# Patient Record
Sex: MALE | Race: BLACK OR AFRICAN AMERICAN | NOT HISPANIC OR LATINO
[De-identification: names, ages, dates, MRNs, and addresses within clinical notes are randomized per-mention and may not be internally consistent; named-entity substitution may affect disease eponyms.]

---

## 2017-03-01 ENCOUNTER — APPOINTMENT (OUTPATIENT)
Dept: UROLOGY | Facility: CLINIC | Age: 72
End: 2017-03-01

## 2017-03-01 VITALS — SYSTOLIC BLOOD PRESSURE: 150 MMHG | HEART RATE: 64 BPM | TEMPERATURE: 97.6 F | DIASTOLIC BLOOD PRESSURE: 79 MMHG

## 2017-03-02 LAB
APPEARANCE: CLEAR
BACTERIA: NEGATIVE
BILIRUBIN URINE: NEGATIVE
BLOOD URINE: ABNORMAL
COLOR: YELLOW
CORE LAB FLUID CYTOLOGY: NORMAL
GLUCOSE QUALITATIVE U: NORMAL MG/DL
KETONES URINE: NEGATIVE
LEUKOCYTE ESTERASE URINE: NEGATIVE
MICROSCOPIC-UA: NORMAL
NITRITE URINE: NEGATIVE
PH URINE: 6
PROTEIN URINE: NEGATIVE MG/DL
RED BLOOD CELLS URINE: 0 /HPF
SPECIFIC GRAVITY URINE: 1.01
SQUAMOUS EPITHELIAL CELLS: 0 /HPF
UROBILINOGEN URINE: NORMAL MG/DL
WHITE BLOOD CELLS URINE: 0 /HPF

## 2017-03-03 LAB — BACTERIA UR CULT: NORMAL

## 2017-05-31 ENCOUNTER — OUTPATIENT (OUTPATIENT)
Dept: OUTPATIENT SERVICES | Facility: HOSPITAL | Age: 72
LOS: 1 days | End: 2017-05-31
Payer: MEDICARE

## 2017-05-31 ENCOUNTER — APPOINTMENT (OUTPATIENT)
Dept: UROLOGY | Facility: CLINIC | Age: 72
End: 2017-05-31

## 2017-05-31 VITALS — HEART RATE: 54 BPM | DIASTOLIC BLOOD PRESSURE: 69 MMHG | SYSTOLIC BLOOD PRESSURE: 121 MMHG | TEMPERATURE: 98.5 F

## 2017-05-31 DIAGNOSIS — Z00.00 ENCOUNTER FOR GENERAL ADULT MEDICAL EXAMINATION W/OUT ABNORMAL FINDINGS: ICD-10-CM

## 2017-05-31 DIAGNOSIS — R35.0 FREQUENCY OF MICTURITION: ICD-10-CM

## 2017-05-31 PROCEDURE — 52000 CYSTOURETHROSCOPY: CPT

## 2017-06-01 DIAGNOSIS — C61 MALIGNANT NEOPLASM OF PROSTATE: ICD-10-CM

## 2017-06-01 DIAGNOSIS — N35.9 URETHRAL STRICTURE, UNSPECIFIED: ICD-10-CM

## 2017-06-01 LAB
APPEARANCE: CLEAR
BACTERIA: NEGATIVE
BILIRUBIN URINE: NEGATIVE
BLOOD URINE: NEGATIVE
COLOR: YELLOW
CORE LAB FLUID CYTOLOGY: NORMAL
GLUCOSE QUALITATIVE U: NORMAL MG/DL
KETONES URINE: ABNORMAL
LEUKOCYTE ESTERASE URINE: NEGATIVE
MICROSCOPIC-UA: NORMAL
NITRITE URINE: NEGATIVE
PH URINE: 6
PROTEIN URINE: NEGATIVE MG/DL
RED BLOOD CELLS URINE: 4 /HPF
SPECIFIC GRAVITY URINE: 1.02
SQUAMOUS EPITHELIAL CELLS: 0 /HPF
UROBILINOGEN URINE: NORMAL MG/DL
WHITE BLOOD CELLS URINE: 1 /HPF

## 2017-06-02 LAB — BACTERIA UR CULT: NORMAL

## 2017-06-13 ENCOUNTER — APPOINTMENT (OUTPATIENT)
Dept: UROLOGY | Facility: CLINIC | Age: 72
End: 2017-06-13

## 2017-06-13 VITALS — DIASTOLIC BLOOD PRESSURE: 89 MMHG | HEART RATE: 65 BPM | SYSTOLIC BLOOD PRESSURE: 148 MMHG | OXYGEN SATURATION: 99 %

## 2017-06-13 DIAGNOSIS — R39.12 POOR URINARY STREAM: ICD-10-CM

## 2017-06-14 ENCOUNTER — OTHER (OUTPATIENT)
Age: 72
End: 2017-06-14

## 2017-06-23 ENCOUNTER — RESULT REVIEW (OUTPATIENT)
Age: 72
End: 2017-06-23

## 2017-06-23 ENCOUNTER — OUTPATIENT (OUTPATIENT)
Dept: OUTPATIENT SERVICES | Facility: HOSPITAL | Age: 72
LOS: 1 days | Discharge: HOME | End: 2017-06-23
Payer: MEDICARE

## 2017-06-23 ENCOUNTER — TRANSCRIPTION ENCOUNTER (OUTPATIENT)
Age: 72
End: 2017-06-23

## 2017-06-23 DIAGNOSIS — K92.1 MELENA: ICD-10-CM

## 2017-06-23 PROCEDURE — 88313 SPECIAL STAINS GROUP 2: CPT

## 2017-06-23 PROCEDURE — 88305 TISSUE EXAM BY PATHOLOGIST: CPT | Mod: 26

## 2017-06-23 PROCEDURE — 88313 SPECIAL STAINS GROUP 2: CPT | Mod: 26

## 2017-06-23 PROCEDURE — 43239 EGD BIOPSY SINGLE/MULTIPLE: CPT

## 2017-06-23 PROCEDURE — 88312 SPECIAL STAINS GROUP 1: CPT

## 2017-06-23 PROCEDURE — 88312 SPECIAL STAINS GROUP 1: CPT | Mod: 26

## 2017-06-23 PROCEDURE — 88305 TISSUE EXAM BY PATHOLOGIST: CPT

## 2017-06-26 LAB — SURGICAL PATHOLOGY FINAL REPORT - CH: SIGNIFICANT CHANGE UP

## 2017-07-02 DIAGNOSIS — K29.50 UNSPECIFIED CHRONIC GASTRITIS WITHOUT BLEEDING: ICD-10-CM

## 2017-07-02 DIAGNOSIS — B37.81 CANDIDAL ESOPHAGITIS: ICD-10-CM

## 2017-07-02 DIAGNOSIS — I48.91 UNSPECIFIED ATRIAL FIBRILLATION: ICD-10-CM

## 2017-07-02 DIAGNOSIS — G47.33 OBSTRUCTIVE SLEEP APNEA (ADULT) (PEDIATRIC): ICD-10-CM

## 2017-07-02 DIAGNOSIS — K44.9 DIAPHRAGMATIC HERNIA WITHOUT OBSTRUCTION OR GANGRENE: ICD-10-CM

## 2017-07-07 ENCOUNTER — OUTPATIENT (OUTPATIENT)
Dept: OUTPATIENT SERVICES | Facility: HOSPITAL | Age: 72
LOS: 1 days | End: 2017-07-07
Payer: MEDICARE

## 2017-07-07 ENCOUNTER — APPOINTMENT (OUTPATIENT)
Dept: UROLOGY | Facility: CLINIC | Age: 72
End: 2017-07-07

## 2017-07-07 VITALS
DIASTOLIC BLOOD PRESSURE: 91 MMHG | SYSTOLIC BLOOD PRESSURE: 162 MMHG | TEMPERATURE: 97.9 F | HEART RATE: 54 BPM | RESPIRATION RATE: 18 BRPM

## 2017-07-07 DIAGNOSIS — R35.0 FREQUENCY OF MICTURITION: ICD-10-CM

## 2017-07-07 PROCEDURE — 52000 CYSTOURETHROSCOPY: CPT

## 2017-07-07 PROCEDURE — 74450 X-RAY URETHRA/BLADDER: CPT

## 2017-07-07 PROCEDURE — 51610 INJECTION FOR BLADDER X-RAY: CPT

## 2017-07-11 DIAGNOSIS — N42.89 OTHER SPECIFIED DISORDERS OF PROSTATE: ICD-10-CM

## 2017-07-24 ENCOUNTER — OUTPATIENT (OUTPATIENT)
Dept: OUTPATIENT SERVICES | Facility: HOSPITAL | Age: 72
LOS: 1 days | End: 2017-07-24
Payer: MEDICARE

## 2017-07-24 VITALS
OXYGEN SATURATION: 97 % | TEMPERATURE: 99 F | HEART RATE: 59 BPM | SYSTOLIC BLOOD PRESSURE: 125 MMHG | DIASTOLIC BLOOD PRESSURE: 80 MMHG | HEIGHT: 72 IN | RESPIRATION RATE: 20 BRPM | WEIGHT: 306.88 LBS

## 2017-07-24 DIAGNOSIS — N42.89 OTHER SPECIFIED DISORDERS OF PROSTATE: ICD-10-CM

## 2017-07-24 DIAGNOSIS — G47.30 SLEEP APNEA, UNSPECIFIED: ICD-10-CM

## 2017-07-24 DIAGNOSIS — N35.9 URETHRAL STRICTURE, UNSPECIFIED: ICD-10-CM

## 2017-07-24 DIAGNOSIS — I10 ESSENTIAL (PRIMARY) HYPERTENSION: ICD-10-CM

## 2017-07-24 DIAGNOSIS — Z90.79 ACQUIRED ABSENCE OF OTHER GENITAL ORGAN(S): Chronic | ICD-10-CM

## 2017-07-24 DIAGNOSIS — Z98.890 OTHER SPECIFIED POSTPROCEDURAL STATES: Chronic | ICD-10-CM

## 2017-07-24 DIAGNOSIS — K21.9 GASTRO-ESOPHAGEAL REFLUX DISEASE WITHOUT ESOPHAGITIS: ICD-10-CM

## 2017-07-24 DIAGNOSIS — Z01.818 ENCOUNTER FOR OTHER PREPROCEDURAL EXAMINATION: ICD-10-CM

## 2017-07-24 DIAGNOSIS — N42.9 DISORDER OF PROSTATE, UNSPECIFIED: ICD-10-CM

## 2017-07-24 LAB
ANION GAP SERPL CALC-SCNC: 14 MMOL/L — SIGNIFICANT CHANGE UP (ref 5–17)
BLD GP AB SCN SERPL QL: NEGATIVE — SIGNIFICANT CHANGE UP
BUN SERPL-MCNC: 21 MG/DL — SIGNIFICANT CHANGE UP (ref 7–23)
CALCIUM SERPL-MCNC: 9.1 MG/DL — SIGNIFICANT CHANGE UP (ref 8.4–10.5)
CHLORIDE SERPL-SCNC: 104 MMOL/L — SIGNIFICANT CHANGE UP (ref 96–108)
CO2 SERPL-SCNC: 20 MMOL/L — LOW (ref 22–31)
CREAT SERPL-MCNC: 1.46 MG/DL — HIGH (ref 0.5–1.3)
GLUCOSE SERPL-MCNC: 92 MG/DL — SIGNIFICANT CHANGE UP (ref 70–99)
HCT VFR BLD CALC: 38.6 % — LOW (ref 39–50)
HGB BLD-MCNC: 12.6 G/DL — LOW (ref 13–17)
MCHC RBC-ENTMCNC: 28.9 PG — SIGNIFICANT CHANGE UP (ref 27–34)
MCHC RBC-ENTMCNC: 32.6 GM/DL — SIGNIFICANT CHANGE UP (ref 32–36)
MCV RBC AUTO: 88.5 FL — SIGNIFICANT CHANGE UP (ref 80–100)
PLATELET # BLD AUTO: 212 K/UL — SIGNIFICANT CHANGE UP (ref 150–400)
POTASSIUM SERPL-MCNC: 4.8 MMOL/L — SIGNIFICANT CHANGE UP (ref 3.5–5.3)
POTASSIUM SERPL-SCNC: 4.8 MMOL/L — SIGNIFICANT CHANGE UP (ref 3.5–5.3)
RBC # BLD: 4.36 M/UL — SIGNIFICANT CHANGE UP (ref 4.2–5.8)
RBC # FLD: 15.1 % — HIGH (ref 10.3–14.5)
RH IG SCN BLD-IMP: POSITIVE — SIGNIFICANT CHANGE UP
SODIUM SERPL-SCNC: 138 MMOL/L — SIGNIFICANT CHANGE UP (ref 135–145)
WBC # BLD: 7.57 K/UL — SIGNIFICANT CHANGE UP (ref 3.8–10.5)
WBC # FLD AUTO: 7.57 K/UL — SIGNIFICANT CHANGE UP (ref 3.8–10.5)

## 2017-07-24 PROCEDURE — G0463: CPT

## 2017-07-24 PROCEDURE — 85027 COMPLETE CBC AUTOMATED: CPT

## 2017-07-24 PROCEDURE — 87086 URINE CULTURE/COLONY COUNT: CPT

## 2017-07-24 PROCEDURE — 86901 BLOOD TYPING SEROLOGIC RH(D): CPT

## 2017-07-24 PROCEDURE — 86900 BLOOD TYPING SEROLOGIC ABO: CPT

## 2017-07-24 PROCEDURE — 80048 BASIC METABOLIC PNL TOTAL CA: CPT

## 2017-07-24 PROCEDURE — 86850 RBC ANTIBODY SCREEN: CPT

## 2017-07-24 RX ORDER — CEFAZOLIN SODIUM 1 G
3000 VIAL (EA) INJECTION ONCE
Qty: 0 | Refills: 0 | Status: DISCONTINUED | OUTPATIENT
Start: 2017-08-02 | End: 2017-08-17

## 2017-07-24 NOTE — H&P PST ADULT - PMH
Afib  controlled with  flecainide  BPH (benign prostatic hyperplasia)    Cervical disc disease  F/u with neurology - currently going to  Physical therapy  GERD (gastroesophageal reflux disease)    H/O prostate cancer  s/p brachy therapy ( 2007 )  HLD (hyperlipidemia)    HTN (hypertension)    Prostate disease  uretheral stricture  Sleep apnea  ON CPAP

## 2017-07-24 NOTE — H&P PST ADULT - PROBLEM SELECTOR PLAN 1
Transurethral Bipolar Resection/vaporization of Prostate/ Stricture, Urethral dilation ,Injection of Triamcinolone 8/2/2017.  Pre- Op instructions discussed  CNC,BMP urine Culture Type and screen sent

## 2017-07-24 NOTE — H&P PST ADULT - PSH
History of hyperbaric oxygen therapy  2014  S/P arthroscopy of shoulder  2008  S/P hernia surgery  left Inguinal ( 2015 )  S/P TURP  2007, 2014 and 2015

## 2017-07-24 NOTE — H&P PST ADULT - ACTIVITY
pt is very active ,able to participate all exercise like walk,1-2 flight of stairs without any distress

## 2017-07-24 NOTE — H&P PST ADULT - HISTORY OF PRESENT ILLNESS
73 y/o male with PMH of ,Afib ,BPH, Obesity, MANE ( on CPAP ) ,GERD, HTN ,HLD ,h/o prostate cancer s/p Brachy (2007),multiple TURP in the past .Pt has been experiencing weak stream, urinary frequency f/u with urology s/p cystoscopy showed proximal stricture .Now coming in PST for scheduled  Transurethral Bipolar Resection/vaporization of Prostate/ Stricture, Urethral dilation ,Injection of Triamcinolone 8/2/2017.

## 2017-07-24 NOTE — H&P PST ADULT - NEGATIVE CARDIOVASCULAR SYMPTOMS
no paroxysmal nocturnal dyspnea/no claudication/no chest pain/no orthopnea/no peripheral edema/no dyspnea on exertion/no palpitations

## 2017-07-24 NOTE — H&P PST ADULT - MUSCULOSKELETAL
details… detailed exam no joint warmth/ROM intact/normal/no calf tenderness/no joint erythema/no joint swelling

## 2017-07-25 LAB
CULTURE RESULTS: NO GROWTH — SIGNIFICANT CHANGE UP
SPECIMEN SOURCE: SIGNIFICANT CHANGE UP

## 2017-08-02 ENCOUNTER — APPOINTMENT (OUTPATIENT)
Dept: UROLOGY | Facility: HOSPITAL | Age: 72
End: 2017-08-02

## 2017-08-02 ENCOUNTER — OUTPATIENT (OUTPATIENT)
Dept: OUTPATIENT SERVICES | Facility: HOSPITAL | Age: 72
LOS: 1 days | End: 2017-08-02
Payer: MEDICARE

## 2017-08-02 ENCOUNTER — TRANSCRIPTION ENCOUNTER (OUTPATIENT)
Age: 72
End: 2017-08-02

## 2017-08-02 ENCOUNTER — RESULT REVIEW (OUTPATIENT)
Age: 72
End: 2017-08-02

## 2017-08-02 VITALS
DIASTOLIC BLOOD PRESSURE: 80 MMHG | RESPIRATION RATE: 17 BRPM | OXYGEN SATURATION: 100 % | TEMPERATURE: 97 F | SYSTOLIC BLOOD PRESSURE: 139 MMHG | HEART RATE: 52 BPM

## 2017-08-02 VITALS
WEIGHT: 300.05 LBS | HEART RATE: 55 BPM | SYSTOLIC BLOOD PRESSURE: 146 MMHG | OXYGEN SATURATION: 98 % | DIASTOLIC BLOOD PRESSURE: 87 MMHG | HEIGHT: 72 IN | RESPIRATION RATE: 18 BRPM | TEMPERATURE: 99 F

## 2017-08-02 DIAGNOSIS — N35.9 URETHRAL STRICTURE, UNSPECIFIED: ICD-10-CM

## 2017-08-02 DIAGNOSIS — N42.89 OTHER SPECIFIED DISORDERS OF PROSTATE: ICD-10-CM

## 2017-08-02 DIAGNOSIS — Z98.890 OTHER SPECIFIED POSTPROCEDURAL STATES: Chronic | ICD-10-CM

## 2017-08-02 DIAGNOSIS — Z90.79 ACQUIRED ABSENCE OF OTHER GENITAL ORGAN(S): Chronic | ICD-10-CM

## 2017-08-02 LAB — RH IG SCN BLD-IMP: POSITIVE — SIGNIFICANT CHANGE UP

## 2017-08-02 PROCEDURE — 88305 TISSUE EXAM BY PATHOLOGIST: CPT

## 2017-08-02 PROCEDURE — C1769: CPT

## 2017-08-02 PROCEDURE — C1726: CPT

## 2017-08-02 PROCEDURE — 88305 TISSUE EXAM BY PATHOLOGIST: CPT | Mod: 26

## 2017-08-02 PROCEDURE — 52601 PROSTATECTOMY (TURP): CPT

## 2017-08-02 PROCEDURE — 88300 SURGICAL PATH GROSS: CPT | Mod: 26

## 2017-08-02 PROCEDURE — 86901 BLOOD TYPING SEROLOGIC RH(D): CPT

## 2017-08-02 PROCEDURE — 86900 BLOOD TYPING SEROLOGIC ABO: CPT

## 2017-08-02 PROCEDURE — 88300 SURGICAL PATH GROSS: CPT

## 2017-08-02 RX ORDER — SODIUM CHLORIDE 9 MG/ML
3 INJECTION INTRAMUSCULAR; INTRAVENOUS; SUBCUTANEOUS EVERY 8 HOURS
Qty: 0 | Refills: 0 | Status: DISCONTINUED | OUTPATIENT
Start: 2017-08-02 | End: 2017-08-02

## 2017-08-02 RX ORDER — ONDANSETRON 8 MG/1
4 TABLET, FILM COATED ORAL ONCE
Qty: 0 | Refills: 0 | Status: DISCONTINUED | OUTPATIENT
Start: 2017-08-02 | End: 2017-08-02

## 2017-08-02 RX ORDER — LIDOCAINE HCL 20 MG/ML
0.2 VIAL (ML) INJECTION ONCE
Qty: 0 | Refills: 0 | Status: DISCONTINUED | OUTPATIENT
Start: 2017-08-02 | End: 2017-08-02

## 2017-08-02 RX ORDER — SODIUM CHLORIDE 9 MG/ML
1000 INJECTION, SOLUTION INTRAVENOUS
Qty: 0 | Refills: 0 | Status: DISCONTINUED | OUTPATIENT
Start: 2017-08-02 | End: 2017-08-17

## 2017-08-02 RX ORDER — CEPHALEXIN 500 MG
1 CAPSULE ORAL
Qty: 6 | Refills: 0 | OUTPATIENT
Start: 2017-08-02 | End: 2017-08-05

## 2017-08-02 RX ORDER — OXYCODONE HYDROCHLORIDE 5 MG/1
1 TABLET ORAL
Qty: 8 | Refills: 0 | OUTPATIENT
Start: 2017-08-02

## 2017-08-02 RX ORDER — ACETAMINOPHEN 500 MG
1000 TABLET ORAL ONCE
Qty: 0 | Refills: 0 | Status: COMPLETED | OUTPATIENT
Start: 2017-08-02 | End: 2017-08-02

## 2017-08-02 RX ADMIN — Medication 400 MILLIGRAM(S): at 10:59

## 2017-08-02 NOTE — BRIEF OPERATIVE NOTE - PROCEDURE
Urethral stricture dilatation  08/02/2017    Active  URR  TURP  08/02/2017    Active  [facility]

## 2017-08-02 NOTE — ASU DISCHARGE PLAN (ADULT/PEDIATRIC). - MEDICATION SUMMARY - MEDICATIONS TO TAKE
I will START or STAY ON the medications listed below when I get home from the hospital:    eplerenone 50 mg oral tablet  -- 1 tab(s) by mouth once a day  -- Indication: For home med    acetaminophen-oxycodone 325 mg-5 mg oral tablet  -- 1 tab(s) by mouth every 4-6 hours, As Needed for pain MDD:4  -- Caution federal law prohibits the transfer of this drug to any person other  than the person for whom it was prescribed.  May cause drowsiness.  Alcohol may intensify this effect.  Use care when operating dangerous machinery.  This prescription cannot be refilled.  This product contains acetaminophen.  Do not use  with any other product containing acetaminophen to prevent possible liver damage.  Using more of this medication than prescribed may cause serious breathing problems.    -- Indication: For Pain med    aspirin 81 mg oral tablet  -- 1 tab(s) by mouth once a day (at bedtime)  -- Indication: For home med    lisinopril 40 mg oral tablet  -- 1 tab(s) by mouth once a day  -- Indication: For home med    flecainide 150 mg oral tablet  -- 1 tab(s) by mouth every 12 hours  -- Indication: For home med    gabapentin 300 mg oral capsule  -- 1 cap(s) by mouth once a day (at bedtime)  -- Indication: For home med    simvastatin 10 mg oral tablet  -- 1 tab(s) by mouth once a day (at bedtime)  -- Indication: For home med    Bystolic 20 mg oral tablet  -- 1 tab(s) by mouth once a day  -- Indication: For home med    amLODIPine 5 mg oral tablet  -- 1 tab(s) by mouth once a day  -- Indication: For home med    Keflex 500 mg oral capsule  -- 1 cap(s) by mouth 2 times a day  -- Finish all this medication unless otherwise directed by prescriber.    -- Indication: For Prevention of infection    raNITIdine 150 mg oral tablet  -- 1 tab(s) by mouth once a day (at bedtime)  -- Indication: For home med    Colace 100 mg oral capsule  -- 2 cap(s) by mouth once a day (at bedtime)  -- Indication: For home med    Pred Forte 1% ophthalmic suspension  -- 1 drop(s) to each affected eye once a day  -- Indication: For home med    omeprazole 20 mg oral delayed release capsule  -- 1 cap(s) by mouth once a day  -- Indication: For home med

## 2017-08-02 NOTE — ASU DISCHARGE PLAN (ADULT/PEDIATRIC). - YOU WERE IN THE HOSPITAL FOR:
Transurethral Resection of Prostate, Prostatic urethral dilation, Injection of kenalog in prostatic stricture

## 2017-08-02 NOTE — ASU DISCHARGE PLAN (ADULT/PEDIATRIC). - ITEMS TO FOLLOWUP WITH YOUR PHYSICIAN'S
Please call the office once you are discharged and follow up with Dr. Art this Friday. Office # [phone]

## 2017-08-03 LAB — SURGICAL PATHOLOGY STUDY: SIGNIFICANT CHANGE UP

## 2017-08-04 ENCOUNTER — APPOINTMENT (OUTPATIENT)
Dept: UROLOGY | Facility: CLINIC | Age: 72
End: 2017-08-04
Payer: MEDICARE

## 2017-08-04 PROCEDURE — 99024 POSTOP FOLLOW-UP VISIT: CPT

## 2017-08-08 ENCOUNTER — APPOINTMENT (OUTPATIENT)
Age: 72
End: 2017-08-08

## 2017-08-18 ENCOUNTER — APPOINTMENT (OUTPATIENT)
Dept: UROLOGY | Facility: CLINIC | Age: 72
End: 2017-08-18
Payer: MEDICARE

## 2017-08-18 VITALS
RESPIRATION RATE: 18 BRPM | DIASTOLIC BLOOD PRESSURE: 85 MMHG | WEIGHT: 298 LBS | HEART RATE: 54 BPM | HEIGHT: 72 IN | TEMPERATURE: 98 F | BODY MASS INDEX: 40.36 KG/M2 | SYSTOLIC BLOOD PRESSURE: 126 MMHG

## 2017-08-18 PROCEDURE — 99024 POSTOP FOLLOW-UP VISIT: CPT

## 2017-08-21 LAB — BACTERIA UR CULT: NORMAL

## 2017-10-27 ENCOUNTER — OUTPATIENT (OUTPATIENT)
Dept: OUTPATIENT SERVICES | Facility: HOSPITAL | Age: 72
LOS: 1 days | End: 2017-10-27
Payer: MEDICARE

## 2017-10-27 ENCOUNTER — APPOINTMENT (OUTPATIENT)
Dept: UROLOGY | Facility: CLINIC | Age: 72
End: 2017-10-27
Payer: MEDICARE

## 2017-10-27 VITALS — DIASTOLIC BLOOD PRESSURE: 92 MMHG | RESPIRATION RATE: 18 BRPM | SYSTOLIC BLOOD PRESSURE: 160 MMHG | HEART RATE: 66 BPM

## 2017-10-27 DIAGNOSIS — Z98.890 OTHER SPECIFIED POSTPROCEDURAL STATES: Chronic | ICD-10-CM

## 2017-10-27 DIAGNOSIS — Z90.79 ACQUIRED ABSENCE OF OTHER GENITAL ORGAN(S): Chronic | ICD-10-CM

## 2017-10-27 DIAGNOSIS — R35.0 FREQUENCY OF MICTURITION: ICD-10-CM

## 2017-10-27 PROCEDURE — 52000 CYSTOURETHROSCOPY: CPT | Mod: 58

## 2017-10-27 PROCEDURE — 52000 CYSTOURETHROSCOPY: CPT

## 2017-11-03 DIAGNOSIS — N42.89 OTHER SPECIFIED DISORDERS OF PROSTATE: ICD-10-CM

## 2018-01-12 ENCOUNTER — APPOINTMENT (OUTPATIENT)
Dept: UROLOGY | Facility: CLINIC | Age: 73
End: 2018-01-12
Payer: MEDICARE

## 2018-01-12 VITALS
RESPIRATION RATE: 18 BRPM | SYSTOLIC BLOOD PRESSURE: 159 MMHG | TEMPERATURE: 98.4 F | HEART RATE: 60 BPM | DIASTOLIC BLOOD PRESSURE: 88 MMHG

## 2018-01-12 PROCEDURE — 99214 OFFICE O/P EST MOD 30 MIN: CPT

## 2018-01-22 ENCOUNTER — APPOINTMENT (OUTPATIENT)
Age: 73
End: 2018-01-22

## 2018-01-22 LAB — PSA SERPL-MCNC: 0.01 NG/ML

## 2018-02-23 ENCOUNTER — APPOINTMENT (OUTPATIENT)
Dept: UROLOGY | Facility: CLINIC | Age: 73
End: 2018-02-23
Payer: MEDICARE

## 2018-02-23 VITALS — DIASTOLIC BLOOD PRESSURE: 80 MMHG | SYSTOLIC BLOOD PRESSURE: 130 MMHG | RESPIRATION RATE: 18 BRPM | HEART RATE: 53 BPM

## 2018-02-23 DIAGNOSIS — R35.0 FREQUENCY OF MICTURITION: ICD-10-CM

## 2018-02-23 DIAGNOSIS — N35.9 URETHRAL STRICTURE, UNSPECIFIED: ICD-10-CM

## 2018-02-23 PROCEDURE — 99214 OFFICE O/P EST MOD 30 MIN: CPT

## 2018-02-23 PROCEDURE — 51798 US URINE CAPACITY MEASURE: CPT

## 2018-02-27 ENCOUNTER — RX RENEWAL (OUTPATIENT)
Age: 73
End: 2018-02-27

## 2018-03-26 ENCOUNTER — RX RENEWAL (OUTPATIENT)
Age: 73
End: 2018-03-26

## 2018-06-02 ENCOUNTER — INPATIENT (INPATIENT)
Facility: HOSPITAL | Age: 73
LOS: 4 days | Discharge: HOME | End: 2018-06-07
Attending: HOSPITALIST | Admitting: HOSPITALIST
Payer: MEDICARE

## 2018-06-02 VITALS
RESPIRATION RATE: 18 BRPM | HEART RATE: 68 BPM | SYSTOLIC BLOOD PRESSURE: 147 MMHG | DIASTOLIC BLOOD PRESSURE: 82 MMHG | TEMPERATURE: 101 F | OXYGEN SATURATION: 97 %

## 2018-06-02 DIAGNOSIS — Z98.890 OTHER SPECIFIED POSTPROCEDURAL STATES: Chronic | ICD-10-CM

## 2018-06-02 DIAGNOSIS — Z90.79 ACQUIRED ABSENCE OF OTHER GENITAL ORGAN(S): Chronic | ICD-10-CM

## 2018-06-02 DIAGNOSIS — I48.91 UNSPECIFIED ATRIAL FIBRILLATION: ICD-10-CM

## 2018-06-02 DIAGNOSIS — N40.0 BENIGN PROSTATIC HYPERPLASIA WITHOUT LOWER URINARY TRACT SYMPTOMS: ICD-10-CM

## 2018-06-02 DIAGNOSIS — I10 ESSENTIAL (PRIMARY) HYPERTENSION: ICD-10-CM

## 2018-06-02 DIAGNOSIS — Z29.9 ENCOUNTER FOR PROPHYLACTIC MEASURES, UNSPECIFIED: ICD-10-CM

## 2018-06-02 DIAGNOSIS — N17.9 ACUTE KIDNEY FAILURE, UNSPECIFIED: ICD-10-CM

## 2018-06-02 DIAGNOSIS — N12 TUBULO-INTERSTITIAL NEPHRITIS, NOT SPECIFIED AS ACUTE OR CHRONIC: ICD-10-CM

## 2018-06-02 LAB
ALBUMIN SERPL ELPH-MCNC: 3.7 G/DL — SIGNIFICANT CHANGE UP (ref 3.3–5)
ALBUMIN SERPL ELPH-MCNC: 4.3 G/DL — SIGNIFICANT CHANGE UP (ref 3.3–5)
ALP SERPL-CCNC: 50 U/L — SIGNIFICANT CHANGE UP (ref 40–120)
ALP SERPL-CCNC: 55 U/L — SIGNIFICANT CHANGE UP (ref 40–120)
ALT FLD-CCNC: 12 U/L — SIGNIFICANT CHANGE UP (ref 4–41)
ALT FLD-CCNC: 12 U/L — SIGNIFICANT CHANGE UP (ref 4–41)
APPEARANCE UR: SIGNIFICANT CHANGE UP
AST SERPL-CCNC: 15 U/L — SIGNIFICANT CHANGE UP (ref 4–40)
AST SERPL-CCNC: 16 U/L — SIGNIFICANT CHANGE UP (ref 4–40)
B PERT DNA SPEC QL NAA+PROBE: SIGNIFICANT CHANGE UP
BASOPHILS # BLD AUTO: 0.02 K/UL — SIGNIFICANT CHANGE UP (ref 0–0.2)
BASOPHILS NFR BLD AUTO: 0.2 % — SIGNIFICANT CHANGE UP (ref 0–2)
BILIRUB SERPL-MCNC: 0.8 MG/DL — SIGNIFICANT CHANGE UP (ref 0.2–1.2)
BILIRUB SERPL-MCNC: 0.9 MG/DL — SIGNIFICANT CHANGE UP (ref 0.2–1.2)
BILIRUB UR-MCNC: NEGATIVE — SIGNIFICANT CHANGE UP
BLOOD UR QL VISUAL: HIGH
BUN SERPL-MCNC: 19 MG/DL — SIGNIFICANT CHANGE UP (ref 7–23)
BUN SERPL-MCNC: 20 MG/DL — SIGNIFICANT CHANGE UP (ref 7–23)
C PNEUM DNA SPEC QL NAA+PROBE: NOT DETECTED — SIGNIFICANT CHANGE UP
CALCIUM SERPL-MCNC: 8.9 MG/DL — SIGNIFICANT CHANGE UP (ref 8.4–10.5)
CALCIUM SERPL-MCNC: 9.3 MG/DL — SIGNIFICANT CHANGE UP (ref 8.4–10.5)
CHLORIDE SERPL-SCNC: 101 MMOL/L — SIGNIFICANT CHANGE UP (ref 98–107)
CHLORIDE SERPL-SCNC: 98 MMOL/L — SIGNIFICANT CHANGE UP (ref 98–107)
CO2 SERPL-SCNC: 22 MMOL/L — SIGNIFICANT CHANGE UP (ref 22–31)
CO2 SERPL-SCNC: 24 MMOL/L — SIGNIFICANT CHANGE UP (ref 22–31)
COLOR SPEC: YELLOW — SIGNIFICANT CHANGE UP
CREAT SERPL-MCNC: 1.75 MG/DL — HIGH (ref 0.5–1.3)
CREAT SERPL-MCNC: 1.93 MG/DL — HIGH (ref 0.5–1.3)
EOSINOPHIL # BLD AUTO: 0.02 K/UL — SIGNIFICANT CHANGE UP (ref 0–0.5)
EOSINOPHIL NFR BLD AUTO: 0.2 % — SIGNIFICANT CHANGE UP (ref 0–6)
FLUAV H1 2009 PAND RNA SPEC QL NAA+PROBE: NOT DETECTED — SIGNIFICANT CHANGE UP
FLUAV H1 RNA SPEC QL NAA+PROBE: NOT DETECTED — SIGNIFICANT CHANGE UP
FLUAV H3 RNA SPEC QL NAA+PROBE: NOT DETECTED — SIGNIFICANT CHANGE UP
FLUAV SUBTYP SPEC NAA+PROBE: SIGNIFICANT CHANGE UP
FLUBV RNA SPEC QL NAA+PROBE: NOT DETECTED — SIGNIFICANT CHANGE UP
GLUCOSE SERPL-MCNC: 111 MG/DL — HIGH (ref 70–99)
GLUCOSE SERPL-MCNC: 124 MG/DL — HIGH (ref 70–99)
GLUCOSE UR-MCNC: NEGATIVE — SIGNIFICANT CHANGE UP
HADV DNA SPEC QL NAA+PROBE: NOT DETECTED — SIGNIFICANT CHANGE UP
HCOV 229E RNA SPEC QL NAA+PROBE: NOT DETECTED — SIGNIFICANT CHANGE UP
HCOV HKU1 RNA SPEC QL NAA+PROBE: NOT DETECTED — SIGNIFICANT CHANGE UP
HCOV NL63 RNA SPEC QL NAA+PROBE: NOT DETECTED — SIGNIFICANT CHANGE UP
HCOV OC43 RNA SPEC QL NAA+PROBE: NOT DETECTED — SIGNIFICANT CHANGE UP
HCT VFR BLD CALC: 35 % — LOW (ref 39–50)
HCT VFR BLD CALC: 37.1 % — LOW (ref 39–50)
HGB BLD-MCNC: 11.6 G/DL — LOW (ref 13–17)
HGB BLD-MCNC: 12.2 G/DL — LOW (ref 13–17)
HMPV RNA SPEC QL NAA+PROBE: NOT DETECTED — SIGNIFICANT CHANGE UP
HPIV1 RNA SPEC QL NAA+PROBE: NOT DETECTED — SIGNIFICANT CHANGE UP
HPIV2 RNA SPEC QL NAA+PROBE: NOT DETECTED — SIGNIFICANT CHANGE UP
HPIV3 RNA SPEC QL NAA+PROBE: NOT DETECTED — SIGNIFICANT CHANGE UP
HPIV4 RNA SPEC QL NAA+PROBE: NOT DETECTED — SIGNIFICANT CHANGE UP
HYALINE CASTS # UR AUTO: SIGNIFICANT CHANGE UP (ref 0–?)
IMM GRANULOCYTES # BLD AUTO: 0.05 # — SIGNIFICANT CHANGE UP
IMM GRANULOCYTES NFR BLD AUTO: 0.6 % — SIGNIFICANT CHANGE UP (ref 0–1.5)
KETONES UR-MCNC: NEGATIVE — SIGNIFICANT CHANGE UP
LEUKOCYTE ESTERASE UR-ACNC: HIGH
LYMPHOCYTES # BLD AUTO: 0.97 K/UL — LOW (ref 1–3.3)
LYMPHOCYTES # BLD AUTO: 10.9 % — LOW (ref 13–44)
M PNEUMO DNA SPEC QL NAA+PROBE: NOT DETECTED — SIGNIFICANT CHANGE UP
MAGNESIUM SERPL-MCNC: 1.9 MG/DL — SIGNIFICANT CHANGE UP (ref 1.6–2.6)
MCHC RBC-ENTMCNC: 28.8 PG — SIGNIFICANT CHANGE UP (ref 27–34)
MCHC RBC-ENTMCNC: 28.9 PG — SIGNIFICANT CHANGE UP (ref 27–34)
MCHC RBC-ENTMCNC: 32.9 % — SIGNIFICANT CHANGE UP (ref 32–36)
MCHC RBC-ENTMCNC: 33.1 % — SIGNIFICANT CHANGE UP (ref 32–36)
MCV RBC AUTO: 87.3 FL — SIGNIFICANT CHANGE UP (ref 80–100)
MCV RBC AUTO: 87.7 FL — SIGNIFICANT CHANGE UP (ref 80–100)
MONOCYTES # BLD AUTO: 0.85 K/UL — SIGNIFICANT CHANGE UP (ref 0–0.9)
MONOCYTES NFR BLD AUTO: 9.6 % — SIGNIFICANT CHANGE UP (ref 2–14)
NEUTROPHILS # BLD AUTO: 6.97 K/UL — SIGNIFICANT CHANGE UP (ref 1.8–7.4)
NEUTROPHILS NFR BLD AUTO: 78.5 % — HIGH (ref 43–77)
NITRITE UR-MCNC: POSITIVE — HIGH
NRBC # FLD: 0 — SIGNIFICANT CHANGE UP
NRBC # FLD: 0 — SIGNIFICANT CHANGE UP
PH UR: 6.5 — SIGNIFICANT CHANGE UP (ref 4.6–8)
PHOSPHATE SERPL-MCNC: 3.4 MG/DL — SIGNIFICANT CHANGE UP (ref 2.5–4.5)
PLATELET # BLD AUTO: 150 K/UL — SIGNIFICANT CHANGE UP (ref 150–400)
PLATELET # BLD AUTO: 175 K/UL — SIGNIFICANT CHANGE UP (ref 150–400)
PMV BLD: 10.3 FL — SIGNIFICANT CHANGE UP (ref 7–13)
PMV BLD: 10.7 FL — SIGNIFICANT CHANGE UP (ref 7–13)
POTASSIUM SERPL-MCNC: 4.5 MMOL/L — SIGNIFICANT CHANGE UP (ref 3.5–5.3)
POTASSIUM SERPL-MCNC: 4.5 MMOL/L — SIGNIFICANT CHANGE UP (ref 3.5–5.3)
POTASSIUM SERPL-SCNC: 4.5 MMOL/L — SIGNIFICANT CHANGE UP (ref 3.5–5.3)
POTASSIUM SERPL-SCNC: 4.5 MMOL/L — SIGNIFICANT CHANGE UP (ref 3.5–5.3)
PROT SERPL-MCNC: 6.9 G/DL — SIGNIFICANT CHANGE UP (ref 6–8.3)
PROT SERPL-MCNC: 7.8 G/DL — SIGNIFICANT CHANGE UP (ref 6–8.3)
PROT UR-MCNC: 300 MG/DL — SIGNIFICANT CHANGE UP
RBC # BLD: 4.01 M/UL — LOW (ref 4.2–5.8)
RBC # BLD: 4.23 M/UL — SIGNIFICANT CHANGE UP (ref 4.2–5.8)
RBC # FLD: 14.3 % — SIGNIFICANT CHANGE UP (ref 10.3–14.5)
RBC # FLD: 14.4 % — SIGNIFICANT CHANGE UP (ref 10.3–14.5)
RBC CASTS # UR COMP ASSIST: SIGNIFICANT CHANGE UP (ref 0–?)
RSV RNA SPEC QL NAA+PROBE: NOT DETECTED — SIGNIFICANT CHANGE UP
RV+EV RNA SPEC QL NAA+PROBE: NOT DETECTED — SIGNIFICANT CHANGE UP
SODIUM SERPL-SCNC: 136 MMOL/L — SIGNIFICANT CHANGE UP (ref 135–145)
SODIUM SERPL-SCNC: 138 MMOL/L — SIGNIFICANT CHANGE UP (ref 135–145)
SP GR SPEC: 1.02 — SIGNIFICANT CHANGE UP (ref 1–1.04)
SQUAMOUS # UR AUTO: SIGNIFICANT CHANGE UP
UROBILINOGEN FLD QL: NORMAL MG/DL — SIGNIFICANT CHANGE UP
WBC # BLD: 10.08 K/UL — SIGNIFICANT CHANGE UP (ref 3.8–10.5)
WBC # BLD: 8.88 K/UL — SIGNIFICANT CHANGE UP (ref 3.8–10.5)
WBC # FLD AUTO: 10.08 K/UL — SIGNIFICANT CHANGE UP (ref 3.8–10.5)
WBC # FLD AUTO: 8.88 K/UL — SIGNIFICANT CHANGE UP (ref 3.8–10.5)
WBC CLUMPS #/AREA URNS HPF: PRESENT — HIGH (ref 0–?)
WBC UR QL: >50 — HIGH (ref 0–?)

## 2018-06-02 PROCEDURE — 99223 1ST HOSP IP/OBS HIGH 75: CPT | Mod: GC

## 2018-06-02 PROCEDURE — 71046 X-RAY EXAM CHEST 2 VIEWS: CPT | Mod: 26

## 2018-06-02 RX ORDER — OXYBUTYNIN CHLORIDE 5 MG
10 TABLET ORAL DAILY
Qty: 0 | Refills: 0 | Status: DISCONTINUED | OUTPATIENT
Start: 2018-06-02 | End: 2018-06-02

## 2018-06-02 RX ORDER — OMEGA-3 ACID ETHYL ESTERS 1 G
2 CAPSULE ORAL
Qty: 0 | Refills: 0 | COMMUNITY

## 2018-06-02 RX ORDER — PANTOPRAZOLE SODIUM 20 MG/1
40 TABLET, DELAYED RELEASE ORAL
Qty: 0 | Refills: 0 | Status: DISCONTINUED | OUTPATIENT
Start: 2018-06-02 | End: 2018-06-05

## 2018-06-02 RX ORDER — RANITIDINE HYDROCHLORIDE 150 MG/1
1 TABLET, FILM COATED ORAL
Qty: 0 | Refills: 0 | COMMUNITY

## 2018-06-02 RX ORDER — SODIUM CHLORIDE 9 MG/ML
1000 INJECTION INTRAMUSCULAR; INTRAVENOUS; SUBCUTANEOUS
Qty: 0 | Refills: 0 | Status: DISCONTINUED | OUTPATIENT
Start: 2018-06-02 | End: 2018-06-04

## 2018-06-02 RX ORDER — CEFTRIAXONE 500 MG/1
1 INJECTION, POWDER, FOR SOLUTION INTRAMUSCULAR; INTRAVENOUS ONCE
Qty: 0 | Refills: 0 | Status: COMPLETED | OUTPATIENT
Start: 2018-06-02 | End: 2018-06-02

## 2018-06-02 RX ORDER — OXYBUTYNIN CHLORIDE 5 MG
5 TABLET ORAL
Qty: 0 | Refills: 0 | Status: DISCONTINUED | OUTPATIENT
Start: 2018-06-02 | End: 2018-06-05

## 2018-06-02 RX ORDER — SIMVASTATIN 20 MG/1
10 TABLET, FILM COATED ORAL AT BEDTIME
Qty: 0 | Refills: 0 | Status: DISCONTINUED | OUTPATIENT
Start: 2018-06-02 | End: 2018-06-05

## 2018-06-02 RX ORDER — MULTIVIT-MIN/FERROUS GLUCONATE 9 MG/15 ML
1 LIQUID (ML) ORAL
Qty: 0 | Refills: 0 | COMMUNITY

## 2018-06-02 RX ORDER — SOLIFENACIN SUCCINATE 10 MG/1
1 TABLET ORAL
Qty: 0 | Refills: 0 | COMMUNITY

## 2018-06-02 RX ORDER — FLECAINIDE ACETATE 50 MG
150 TABLET ORAL EVERY 12 HOURS
Qty: 0 | Refills: 0 | Status: DISCONTINUED | OUTPATIENT
Start: 2018-06-02 | End: 2018-06-05

## 2018-06-02 RX ORDER — PREDNISOLONE SODIUM PHOSPHATE 1 %
1 DROPS OPHTHALMIC (EYE)
Qty: 0 | Refills: 0 | COMMUNITY

## 2018-06-02 RX ORDER — ASPIRIN/CALCIUM CARB/MAGNESIUM 324 MG
81 TABLET ORAL DAILY
Qty: 0 | Refills: 0 | Status: DISCONTINUED | OUTPATIENT
Start: 2018-06-02 | End: 2018-06-05

## 2018-06-02 RX ORDER — CEFTRIAXONE 500 MG/1
1 INJECTION, POWDER, FOR SOLUTION INTRAMUSCULAR; INTRAVENOUS EVERY 24 HOURS
Qty: 0 | Refills: 0 | Status: DISCONTINUED | OUTPATIENT
Start: 2018-06-03 | End: 2018-06-04

## 2018-06-02 RX ORDER — NEBIVOLOL HYDROCHLORIDE 5 MG/1
20 TABLET ORAL DAILY
Qty: 0 | Refills: 0 | Status: DISCONTINUED | OUTPATIENT
Start: 2018-06-02 | End: 2018-06-05

## 2018-06-02 RX ORDER — ACETAMINOPHEN 500 MG
2 TABLET ORAL
Qty: 0 | Refills: 0 | COMMUNITY

## 2018-06-02 RX ORDER — GABAPENTIN 400 MG/1
300 CAPSULE ORAL AT BEDTIME
Qty: 0 | Refills: 0 | Status: DISCONTINUED | OUTPATIENT
Start: 2018-06-02 | End: 2018-06-05

## 2018-06-02 RX ORDER — FLECAINIDE ACETATE 50 MG
1 TABLET ORAL
Qty: 0 | Refills: 0 | COMMUNITY

## 2018-06-02 RX ORDER — OMEPRAZOLE 10 MG/1
1 CAPSULE, DELAYED RELEASE ORAL
Qty: 0 | Refills: 0 | COMMUNITY

## 2018-06-02 RX ORDER — HEPARIN SODIUM 5000 [USP'U]/ML
5000 INJECTION INTRAVENOUS; SUBCUTANEOUS EVERY 8 HOURS
Qty: 0 | Refills: 0 | Status: DISCONTINUED | OUTPATIENT
Start: 2018-06-02 | End: 2018-06-05

## 2018-06-02 RX ORDER — AMLODIPINE BESYLATE 2.5 MG/1
1 TABLET ORAL
Qty: 0 | Refills: 0 | COMMUNITY

## 2018-06-02 RX ORDER — DOCUSATE SODIUM 100 MG
300 CAPSULE ORAL AT BEDTIME
Qty: 0 | Refills: 0 | Status: DISCONTINUED | OUTPATIENT
Start: 2018-06-02 | End: 2018-06-05

## 2018-06-02 RX ORDER — NEBIVOLOL HYDROCHLORIDE 5 MG/1
1 TABLET ORAL
Qty: 0 | Refills: 0 | COMMUNITY

## 2018-06-02 RX ORDER — FLECAINIDE ACETATE 50 MG
150 TABLET ORAL EVERY 12 HOURS
Qty: 0 | Refills: 0 | Status: DISCONTINUED | OUTPATIENT
Start: 2018-06-02 | End: 2018-06-02

## 2018-06-02 RX ORDER — ASPIRIN/CALCIUM CARB/MAGNESIUM 324 MG
1 TABLET ORAL
Qty: 0 | Refills: 0 | COMMUNITY

## 2018-06-02 RX ORDER — SODIUM CHLORIDE 9 MG/ML
1000 INJECTION INTRAMUSCULAR; INTRAVENOUS; SUBCUTANEOUS ONCE
Qty: 0 | Refills: 0 | Status: COMPLETED | OUTPATIENT
Start: 2018-06-02 | End: 2018-06-02

## 2018-06-02 RX ORDER — AMLODIPINE BESYLATE 2.5 MG/1
10 TABLET ORAL DAILY
Qty: 0 | Refills: 0 | Status: DISCONTINUED | OUTPATIENT
Start: 2018-06-02 | End: 2018-06-05

## 2018-06-02 RX ORDER — SIMVASTATIN 20 MG/1
1 TABLET, FILM COATED ORAL
Qty: 0 | Refills: 0 | COMMUNITY

## 2018-06-02 RX ORDER — DOCUSATE SODIUM 100 MG
2 CAPSULE ORAL
Qty: 0 | Refills: 0 | COMMUNITY

## 2018-06-02 RX ORDER — GABAPENTIN 400 MG/1
1 CAPSULE ORAL
Qty: 0 | Refills: 0 | COMMUNITY

## 2018-06-02 RX ORDER — ACETAMINOPHEN 500 MG
650 TABLET ORAL ONCE
Qty: 0 | Refills: 0 | Status: COMPLETED | OUTPATIENT
Start: 2018-06-02 | End: 2018-06-02

## 2018-06-02 RX ADMIN — SODIUM CHLORIDE 1000 MILLILITER(S): 9 INJECTION INTRAMUSCULAR; INTRAVENOUS; SUBCUTANEOUS at 12:07

## 2018-06-02 RX ADMIN — Medication 5 MILLIGRAM(S): at 17:23

## 2018-06-02 RX ADMIN — Medication 150 MILLIGRAM(S): at 17:23

## 2018-06-02 RX ADMIN — SIMVASTATIN 10 MILLIGRAM(S): 20 TABLET, FILM COATED ORAL at 23:36

## 2018-06-02 RX ADMIN — HEPARIN SODIUM 5000 UNIT(S): 5000 INJECTION INTRAVENOUS; SUBCUTANEOUS at 23:36

## 2018-06-02 RX ADMIN — Medication 81 MILLIGRAM(S): at 17:23

## 2018-06-02 RX ADMIN — Medication 650 MILLIGRAM(S): at 14:22

## 2018-06-02 RX ADMIN — GABAPENTIN 300 MILLIGRAM(S): 400 CAPSULE ORAL at 23:36

## 2018-06-02 RX ADMIN — Medication 300 MILLIGRAM(S): at 23:36

## 2018-06-02 RX ADMIN — SODIUM CHLORIDE 80 MILLILITER(S): 9 INJECTION INTRAMUSCULAR; INTRAVENOUS; SUBCUTANEOUS at 17:16

## 2018-06-02 RX ADMIN — CEFTRIAXONE 100 GRAM(S): 500 INJECTION, POWDER, FOR SOLUTION INTRAMUSCULAR; INTRAVENOUS at 12:07

## 2018-06-02 NOTE — H&P ADULT - NSHPPHYSICALEXAM_GEN_ALL_CORE
GENERAL: Overweight, Comfortable, no acute distress   HEAD:  Normocephalic, atraumatic  EYES: EOMI, PERRLA  HEENT: Moist mucous membranes  NECK: Supple, No JVD  CHEST/LUNG: Clear to auscultation bilaterally, no crackles or wheezes  HEART: Regular rate and rhythm, no murmur   ABDOMEN: Soft, Nontender, Nondistended, Bowel sounds present  EXTREMITIES:   No clubbing, cyanosis, or edema  MUSCULOSKELETAL: No muscle tenderness, no joint tenderness  SKIN: warm and dry, no rash   NERVOUS SYSTEM:  Alert & Oriented X3, Motor Strength 5/5 B/L upper and lower extremities. Sensation intact B/L

## 2018-06-02 NOTE — H&P ADULT - ATTENDING COMMENTS
Presentation is consistent with acute pyelonephritis. Pt feels better s/p abx and IV fluids, but still has rigors. Continue ceftriaxone, f/u BCx and UCx. Given obesity and other cardiovascular disease, will check A1c and fasting lipid panel in AM.

## 2018-06-02 NOTE — ED ADULT TRIAGE NOTE - CHIEF COMPLAINT QUOTE
states " I don't feel good since 3 days with frequent urination with fever and chills and has pain on left side on and off ". patient is on Vesicare. took ASA today. h/o Afib with monitor device in place,. denies use of blood thinners. temp in triage 100.9. patient feels oozy and denies cp/dizziness.

## 2018-06-02 NOTE — ED ADULT NURSE REASSESSMENT NOTE - NS ED NURSE REASSESS COMMENT FT1
received report on pt from HEATHER ELE. pt presents awake a&ox4, denies dizziness or ha. skin warm, dry, appropriate for race. respirations even unlabored. denies cp or sob. abdomen soft nontender nondistended. denies n/v/d. denies fevers or chills at present. pt endorses lower back pain, but verbalizes improvement in overall symptoms. IVL site clean, dry, intact, patent. wife at bedside. safety maintained. will continue to observe.

## 2018-06-02 NOTE — H&P ADULT - NSHPLABSRESULTS_GEN_ALL_CORE
12.2   10.08 )-----------( 175      ( 02 Jun 2018 11:18 )             37.1       06-02    136  |  98  |  20  ----------------------------<  111<H>  4.5   |  24  |  1.93<H>    Ca    9.3      02 Jun 2018 11:18    TPro  7.8  /  Alb  4.3  /  TBili  0.9  /  DBili  x   /  AST  15  /  ALT  12  /  AlkPhos  55  06-02      LIVER FUNCTIONS - ( 02 Jun 2018 11:18 )  Alb: 4.3 g/dL / Pro: 7.8 g/dL / ALK PHOS: 55 u/L / ALT: 12 u/L / AST: 15 u/L / GGT: x

## 2018-06-02 NOTE — H&P ADULT - HISTORY OF PRESENT ILLNESS
73M w/ PMH of prostate cancer s/p brachytherapy (2007) and TURP (2015), HTN, HLD, AFib (not on anticoagulation) presenting with urinary frequency, suprapubic and low back pain.      In the ED, febrile to 100.9, HR 50-60, -140/60-70, satting 100% on room air.   He received tylenol, 1L NS and ceftriaxone 1g. 73M w/ PMH of prostate cancer s/p brachytherapy (2007) and TURP (2015), HTN, HLD, AFib (not on anticoagulation, loop recorder implanted 3 months ago) presenting with urinary frequency, suprapubic pressure and low back pain.  Patient states that he started noticing increased urinary frequency and urgency about 1 week ago, denies any dysuria or hematuria.  Back pain started a few days later, worse on his left side.  Last night he had a fever to 100 and this morning had a fever and rigors which prompted him to come to the ED.  States that he currently feels better compared to when he came to the ED.  Denies any nausea, vomiting, diarrhea.  Has been able to eat and drink normally.     In the ED, febrile to 100.9, HR 50-60, -140/60-70, satting 100% on room air.   He received tylenol, 1L NS and ceftriaxone 1g.

## 2018-06-02 NOTE — H&P ADULT - NSHPREVIEWOFSYSTEMS_GEN_ALL_CORE
CONSTITUTIONAL:  +FEVERS, RIGORS; No weakness or fatigue.  HEENT:  Eyes:  No visual loss, blurred vision, double vision or yellow sclerae. Ears, Nose, Throat:  No hearing loss, sneezing, congestion, runny nose or sore throat.  SKIN:  No rash or itching.  CARDIOVASCULAR:  No chest pain, chest pressure or chest discomfort. No palpitations or edema.  RESPIRATORY:  No shortness of breath, cough or sputum.  GASTROINTESTINAL:  +LEFT FLANK PAIN, SUPRAPUBIC PRESSURE; No anorexia, nausea, vomiting or diarrhea.  GENITOURINARY:  +URINARY FREQUENCY; Denies hematuria, dysuria.   NEUROLOGICAL:  No headache, dizziness, syncope, paralysis, ataxia, numbness or tingling in the extremities. No change in bowel or bladder control.  MUSCULOSKELETAL:  No muscle, back pain, joint pain or stiffness.  HEMATOLOGIC:  No anemia, bleeding or bruising.  LYMPHATICS:  No enlarged nodes. No history of splenectomy.  PSYCHIATRIC:  No history of depression or anxiety.  ENDOCRINOLOGIC:  No reports of sweating, cold or heat intolerance. No polyuria or polydipsia.  ALLERGIES:  No history of asthma, hives, eczema or rhinitis.

## 2018-06-02 NOTE — H&P ADULT - PROBLEM SELECTOR PLAN 3
Need to clarify medication dosages with patient's wife  - c/w amlodipine, lisinopril - c/w amlodipine, bystolic  - holding home lisinopril

## 2018-06-02 NOTE — ED PROVIDER NOTE - OBJECTIVE STATEMENT
74 y/o M PMHx HTN, HLD, afib (not on AC, has a loop recorder in place), prostate ca here w/ urinary frequency, suprapubic pain and lower back pain, L>R x3 days. Had rigors, chills, and body aches prompting ED visit. Took Vesicare initially, thinking it was his overactive bladder.. Took 2 Excedrin prior to ED arrival w/ relief of body aches. Denies N/V, or any other complaints.

## 2018-06-02 NOTE — H&P ADULT - PROBLEM SELECTOR PLAN 2
Increase in creatinine to 1.9 (baseline ~ 1.4) due to pyelonephritis, no issues urinating   - give IVF, monitor improvement Increase in creatinine to 1.9 (baseline ~ 1.4) due to pyelonephritis, no issues urinating   - give IVF, monitor improvement  - holding home lisinopril Increase in creatinine to 1.9 (baseline ~ 1.4) due to pyelonephritis, no issues urinating   - give IVF, monitor improvement  - holding home lisinopril and eplerenone

## 2018-06-02 NOTE — H&P ADULT - PROBLEM SELECTOR PLAN 1
Positive urinalysis with complaints of left flank pain  - s/p ceftriaxone 1g x 1 dose, will continue for 5 day course   - f/u urine culture  - f/u blood cultures  - IVF, normal saline at 80 cc/hr

## 2018-06-02 NOTE — ED PROVIDER NOTE - ATTENDING CONTRIBUTION TO CARE
DR. BARNARD, ATTENDING MD-  I performed a face to face bedside interview with patient regarding history of present illness, review of symptoms and past medical history. I completed an independent physical exam.  I have discussed patient's plan of care with PA.   Documentation as above in the note.    74 y/o male h/o bph s/p turp x4, htn, afib, prost ca here with c/o inc urinary frequency, fever, chills with left side pain x3 days.  No h/o uti's.  Denies ha, neck stiffness, cp, sob, cough, abd pain, n/v/d, dysuria, rash.  Febrile, vs wnl, fatigued, dry mm, ctabil, s1s2 rrr no m/r/g, abd soft mild suprapubic ttp no r/g, no cva tenderness b/l, no leg swelling b/l, no rash.  Concern for ascending uti.  Obtain cbc, bmp, ua, ucx, give ivf, abx, will need admission for further care.

## 2018-06-02 NOTE — ED ADULT NURSE NOTE - OBJECTIVE STATEMENT
Pt. A&Ox4, c/o left flank pain radiating into groin with fever 100.9 last night, urinary urgency and pelvic pressure x 3 days. h/o BPH and TURP. Started taking Vesicare again 3 days ago. Denies dysuria, abdominal pain or n/v. Labs and urine sent. MD at bedside for eval. VS done as charted, breathing well on RA. Will continue to monitor. Pt. A&Ox4, c/o left flank pain radiating into groin with fever 100.9 last night, rigors, urinary urgency and pelvic pressure x 3 days. h/o BPH and TURP. Started taking Vesicare again 3 days ago. Also c/o left ear pain that lasted a few hours yesterday. Denies dysuria, abdominal pain or n/v. #20g IV placed in left AC, Labs and urine sent. MD at bedside for eval. VS done as charted, breathing well on RA. Will continue to monitor.

## 2018-06-02 NOTE — H&P ADULT - ASSESSMENT
73M w/ PMH of prostate cancer s/p brachytherapy (2007) and TURP (2015), HTN, HLD, AFib (not on anticoagulation, loop recorder implanted 3 months ago) presenting with pyelonephritis and ANITA.

## 2018-06-03 ENCOUNTER — TRANSCRIPTION ENCOUNTER (OUTPATIENT)
Age: 73
End: 2018-06-03

## 2018-06-03 LAB
ALBUMIN SERPL ELPH-MCNC: 3.4 G/DL — SIGNIFICANT CHANGE UP (ref 3.3–5)
ALP SERPL-CCNC: 47 U/L — SIGNIFICANT CHANGE UP (ref 40–120)
ALT FLD-CCNC: 8 U/L — SIGNIFICANT CHANGE UP (ref 4–41)
AST SERPL-CCNC: 12 U/L — SIGNIFICANT CHANGE UP (ref 4–40)
BASOPHILS # BLD AUTO: 0.04 K/UL — SIGNIFICANT CHANGE UP (ref 0–0.2)
BASOPHILS NFR BLD AUTO: 0.5 % — SIGNIFICANT CHANGE UP (ref 0–2)
BILIRUB SERPL-MCNC: 0.6 MG/DL — SIGNIFICANT CHANGE UP (ref 0.2–1.2)
BUN SERPL-MCNC: 21 MG/DL — SIGNIFICANT CHANGE UP (ref 7–23)
CALCIUM SERPL-MCNC: 8.5 MG/DL — SIGNIFICANT CHANGE UP (ref 8.4–10.5)
CHLORIDE SERPL-SCNC: 101 MMOL/L — SIGNIFICANT CHANGE UP (ref 98–107)
CHOLEST SERPL-MCNC: 119 MG/DL — LOW (ref 120–199)
CO2 SERPL-SCNC: 23 MMOL/L — SIGNIFICANT CHANGE UP (ref 22–31)
CREAT SERPL-MCNC: 1.67 MG/DL — HIGH (ref 0.5–1.3)
EOSINOPHIL # BLD AUTO: 0.09 K/UL — SIGNIFICANT CHANGE UP (ref 0–0.5)
EOSINOPHIL NFR BLD AUTO: 1.1 % — SIGNIFICANT CHANGE UP (ref 0–6)
GLUCOSE SERPL-MCNC: 88 MG/DL — SIGNIFICANT CHANGE UP (ref 70–99)
HBA1C BLD-MCNC: 5.7 % — HIGH (ref 4–5.6)
HCT VFR BLD CALC: 34.7 % — LOW (ref 39–50)
HDLC SERPL-MCNC: 53 MG/DL — SIGNIFICANT CHANGE UP (ref 35–55)
HGB BLD-MCNC: 11.3 G/DL — LOW (ref 13–17)
IMM GRANULOCYTES # BLD AUTO: 0.04 # — SIGNIFICANT CHANGE UP
IMM GRANULOCYTES NFR BLD AUTO: 0.5 % — SIGNIFICANT CHANGE UP (ref 0–1.5)
LIPID PNL WITH DIRECT LDL SERPL: 55 MG/DL — SIGNIFICANT CHANGE UP
LYMPHOCYTES # BLD AUTO: 2.14 K/UL — SIGNIFICANT CHANGE UP (ref 1–3.3)
LYMPHOCYTES # BLD AUTO: 25.7 % — SIGNIFICANT CHANGE UP (ref 13–44)
MCHC RBC-ENTMCNC: 29 PG — SIGNIFICANT CHANGE UP (ref 27–34)
MCHC RBC-ENTMCNC: 32.6 % — SIGNIFICANT CHANGE UP (ref 32–36)
MCV RBC AUTO: 89.2 FL — SIGNIFICANT CHANGE UP (ref 80–100)
METHOD TYPE: SIGNIFICANT CHANGE UP
MONOCYTES # BLD AUTO: 0.92 K/UL — HIGH (ref 0–0.9)
MONOCYTES NFR BLD AUTO: 11 % — SIGNIFICANT CHANGE UP (ref 2–14)
NEUTROPHILS # BLD AUTO: 5.1 K/UL — SIGNIFICANT CHANGE UP (ref 1.8–7.4)
NEUTROPHILS NFR BLD AUTO: 61.2 % — SIGNIFICANT CHANGE UP (ref 43–77)
NRBC # FLD: 0 — SIGNIFICANT CHANGE UP
ORGANISM # SPEC MICROSCOPIC CNT: SIGNIFICANT CHANGE UP
ORGANISM # SPEC MICROSCOPIC CNT: SIGNIFICANT CHANGE UP
PLATELET # BLD AUTO: 159 K/UL — SIGNIFICANT CHANGE UP (ref 150–400)
PMV BLD: 11 FL — SIGNIFICANT CHANGE UP (ref 7–13)
POTASSIUM SERPL-MCNC: 4.4 MMOL/L — SIGNIFICANT CHANGE UP (ref 3.5–5.3)
POTASSIUM SERPL-SCNC: 4.4 MMOL/L — SIGNIFICANT CHANGE UP (ref 3.5–5.3)
PROT SERPL-MCNC: 6.7 G/DL — SIGNIFICANT CHANGE UP (ref 6–8.3)
RBC # BLD: 3.89 M/UL — LOW (ref 4.2–5.8)
RBC # FLD: 14.5 % — SIGNIFICANT CHANGE UP (ref 10.3–14.5)
SODIUM SERPL-SCNC: 138 MMOL/L — SIGNIFICANT CHANGE UP (ref 135–145)
SPECIMEN SOURCE: SIGNIFICANT CHANGE UP
TRIGL SERPL-MCNC: 72 MG/DL — SIGNIFICANT CHANGE UP (ref 10–149)
WBC # BLD: 8.33 K/UL — SIGNIFICANT CHANGE UP (ref 3.8–10.5)
WBC # FLD AUTO: 8.33 K/UL — SIGNIFICANT CHANGE UP (ref 3.8–10.5)

## 2018-06-03 PROCEDURE — 99233 SBSQ HOSP IP/OBS HIGH 50: CPT

## 2018-06-03 RX ORDER — ACETAMINOPHEN 500 MG
650 TABLET ORAL EVERY 6 HOURS
Qty: 0 | Refills: 0 | Status: DISCONTINUED | OUTPATIENT
Start: 2018-06-03 | End: 2018-06-05

## 2018-06-03 RX ORDER — ACETAMINOPHEN 500 MG
650 TABLET ORAL ONCE
Qty: 0 | Refills: 0 | Status: COMPLETED | OUTPATIENT
Start: 2018-06-03 | End: 2018-06-03

## 2018-06-03 RX ADMIN — Medication 650 MILLIGRAM(S): at 20:43

## 2018-06-03 RX ADMIN — Medication 650 MILLIGRAM(S): at 12:30

## 2018-06-03 RX ADMIN — HEPARIN SODIUM 5000 UNIT(S): 5000 INJECTION INTRAVENOUS; SUBCUTANEOUS at 21:37

## 2018-06-03 RX ADMIN — HEPARIN SODIUM 5000 UNIT(S): 5000 INJECTION INTRAVENOUS; SUBCUTANEOUS at 12:00

## 2018-06-03 RX ADMIN — SIMVASTATIN 10 MILLIGRAM(S): 20 TABLET, FILM COATED ORAL at 21:37

## 2018-06-03 RX ADMIN — AMLODIPINE BESYLATE 10 MILLIGRAM(S): 2.5 TABLET ORAL at 06:21

## 2018-06-03 RX ADMIN — Medication 300 MILLIGRAM(S): at 21:37

## 2018-06-03 RX ADMIN — HEPARIN SODIUM 5000 UNIT(S): 5000 INJECTION INTRAVENOUS; SUBCUTANEOUS at 06:21

## 2018-06-03 RX ADMIN — Medication 650 MILLIGRAM(S): at 11:55

## 2018-06-03 RX ADMIN — PANTOPRAZOLE SODIUM 40 MILLIGRAM(S): 20 TABLET, DELAYED RELEASE ORAL at 06:21

## 2018-06-03 RX ADMIN — Medication 81 MILLIGRAM(S): at 12:00

## 2018-06-03 RX ADMIN — CEFTRIAXONE 100 GRAM(S): 500 INJECTION, POWDER, FOR SOLUTION INTRAMUSCULAR; INTRAVENOUS at 11:55

## 2018-06-03 RX ADMIN — Medication 5 MILLIGRAM(S): at 17:17

## 2018-06-03 RX ADMIN — Medication 5 MILLIGRAM(S): at 06:21

## 2018-06-03 NOTE — DISCHARGE NOTE ADULT - SECONDARY DIAGNOSIS.
ANITA (acute kidney injury) BPH (benign prostatic hyperplasia) Afib H/O prostate cancer HTN (hypertension) HLD (hyperlipidemia)

## 2018-06-03 NOTE — DISCHARGE NOTE ADULT - CARE PLAN
Principal Discharge DX:	Pyelonephritis  Goal:	Resolution and return to baseline  Assessment and plan of treatment:	Continue antibiotics as directed and monitor for signs/symptoms of infection, such as, fever/chills, burning/pain with urination, urinary frequency/hesitancy, cloudy urine, or blood in urine.  Secondary Diagnosis:	ANITA (acute kidney injury)  Goal:	Resolution and return to baseline  Secondary Diagnosis:	BPH (benign prostatic hyperplasia)  Assessment and plan of treatment:	Continue your medications as directed and please follow-up as an outpatient with your primary care provider for further care and recommendations.  Secondary Diagnosis:	Afib  Assessment and plan of treatment:	Continue your medications as directed and please follow-up as an outpatient with your primary care provider for further care and recommendations. ***** AC??????  Secondary Diagnosis:	H/O prostate cancer  Assessment and plan of treatment:	Continue your medications as directed and please follow-up as an outpatient with your primary care provider for further care and recommendations.  Secondary Diagnosis:	HTN (hypertension)  Assessment and plan of treatment:	Continue blood pressure medication regimen as directed. Monitor for any visual changes, headaches or dizziness.  Monitor blood pressure regularly.  Follow up with your PCP for further management for high blood pressure.  Secondary Diagnosis:	HLD (hyperlipidemia)  Assessment and plan of treatment:	Continue cholesterol control medications. Continue DASH diet. Follow up with your PCP within 1 week of discharge for further management and monitoring of lipid and cholesterol panels. Principal Discharge DX:	Pyelonephritis  Goal:	Resolution and return to baseline  Assessment and plan of treatment:	Continue antibiotics Cipro 500 mg oral 2xdaily till 6/14/18 as directed and monitor for signs/symptoms of infection, such as fever/chills, burning/pain with urination, urinary frequency/hesitancy, cloudy urine, or blood in urine.  Follow up with PCP as outpatient for further management  Secondary Diagnosis:	ANITA (acute kidney injury)  Goal:	Resolution and return to baseline  Assessment and plan of treatment:	Your Creatinine as of today is 1.47.   Your home lisinopril and Eplerenone was held in the hospital. Please follow up with PCP or Card to follow up for routine labs and when to resume Lisinopril and Eplerenone  Secondary Diagnosis:	BPH (benign prostatic hyperplasia)  Assessment and plan of treatment:	Continue your medications as directed and please follow-up as an outpatient with your primary care provider and Urologist for further care and recommendations.  Secondary Diagnosis:	Afib  Assessment and plan of treatment:	You are not on anticoagulation meds, despite CHADS score of 2 (age > 75 + HTN).   Unclear if there is history of major bleeding. For now continue with Aspirin 81mg and address possible addition of Apixaban for stroke prevention as out-pt.  Continue your medications as directed and please follow-up as an outpatient with your primary care provider for further care and recommendations.  Secondary Diagnosis:	H/O prostate cancer  Assessment and plan of treatment:	Continue your medications as directed and please follow-up as an outpatient with your primary care provider for further care and recommendations.  Secondary Diagnosis:	HTN (hypertension)  Assessment and plan of treatment:	Continue blood pressure medication regimen Norvasc and Bystolic. Holding home lisinopril due to recent ANITA, consider resuming as out pt if repeat renal function stable. Monitor for any visual changes, headaches or dizziness.  Monitor blood pressure regularly.    Follow up with your PCP for further management for high blood pressure.  Secondary Diagnosis:	HLD (hyperlipidemia)  Assessment and plan of treatment:	Continue cholesterol control medications. Continue DASH diet. Follow up with your PCP within 1 week of discharge for further management and monitoring of lipid and cholesterol panels.

## 2018-06-03 NOTE — DISCHARGE NOTE ADULT - PATIENT PORTAL LINK FT
You can access the [facility] Patient Portal, offered by [facility], by registering with the following website: [email or web address]

## 2018-06-03 NOTE — PHYSICAL THERAPY INITIAL EVALUATION ADULT - PERTINENT HX OF CURRENT PROBLEM, REHAB EVAL
73M w/ PMH of prostate cancer s/p brachytherapy (2007) and TURP (2015), HTN, HLD, AFib (not on anticoagulation, loop recorder implanted 3 months ago) presenting with urinary frequency, suprapubic pressure and low back pain.

## 2018-06-03 NOTE — DISCHARGE NOTE ADULT - ADDITIONAL INSTRUCTIONS
Continue your medications as directed and please follow-up as an outpatient with your primary care provider for further care and recommendations.

## 2018-06-03 NOTE — PHYSICAL THERAPY INITIAL EVALUATION ADULT - DISCHARGE DISPOSITION, PT EVAL
Home with no skilled PT needs; Pt. presents without gross impairment and independent with functional mobility. Skilled, therapeutic PT intervention not indicated at this time. Pt. will be d/c from PT program at this time.

## 2018-06-03 NOTE — DISCHARGE NOTE ADULT - HOSPITAL COURSE
73M w/ PMH of prostate cancer s/p brachytherapy (2007) and TURP (2015), HTN, HLD, AFib (not on anticoagulation, loop recorder implanted 3 months ago) presenting with pyelonephritis and ANITA. 73M w/ PMH of prostate cancer s/p brachytherapy (2007) and TURP (2015), HTN, HLD, AFib (not on anticoagulation, loop recorder implanted 3 months ago) presenting with pyelonephritis and ANITA.     Hospital Course    Pyelonephritis- Positive urinalysis with complaints of left flank pain, on Ceftriaxone 2g IV with transition to Cipro po BID thru 6/14. Blood Cx- Ecoli & Urine Cx E. Coli > 100,000. ID Dr Figueroa consulted. 6/4 repeat Blood cx- NTD    ANITA- Increase in creatinine to 1.9 (baseline ~ 1.4) due to pyelonephritis, no issues urinating, holding home lisinopril and eplerenone.     HTN- c/w Amlodipine, Bystolic holding home lisinopril due to recent ANITA, consider resuming as outpt if repeat renal fx stable     Afib- Not on anticoagulation, c/w aspirin 81mg, flecainide. Patient has CHADs score of at least 2 and should potentially be on anti-coagulation - Should be addressed at some point during hospital stay to determine if AC initiation is appropriate or follow-up outpatient     Elevated HgA1C- HgA1C level is 5.7%. F/u outpatient in 3 months   Dispo planning home with outpatient follow up.

## 2018-06-03 NOTE — DISCHARGE NOTE ADULT - MEDICATION SUMMARY - MEDICATIONS TO STOP TAKING
I will STOP taking the medications listed below when I get home from the hospital:    eplerenone 50 mg oral tablet  -- 1 tab(s) by mouth once a day    gabapentin 300 mg oral capsule  -- 1 cap(s) by mouth once a day (at bedtime)    lisinopril 40 mg oral tablet  -- 1 tab(s) by mouth once a day

## 2018-06-03 NOTE — DISCHARGE NOTE ADULT - CARE PROVIDER_API CALL
Judson Davidson), Internal Medicine  [address] [place] [address]  Phone: [phone]  Fax: [phone]

## 2018-06-03 NOTE — DISCHARGE NOTE ADULT - PLAN OF CARE
Resolution and return to baseline Continue antibiotics as directed and monitor for signs/symptoms of infection, such as, fever/chills, burning/pain with urination, urinary frequency/hesitancy, cloudy urine, or blood in urine. Continue your medications as directed and please follow-up as an outpatient with your primary care provider for further care and recommendations. Continue your medications as directed and please follow-up as an outpatient with your primary care provider for further care and recommendations. ***** AC?????? Continue blood pressure medication regimen as directed. Monitor for any visual changes, headaches or dizziness.  Monitor blood pressure regularly.  Follow up with your PCP for further management for high blood pressure. Continue cholesterol control medications. Continue DASH diet. Follow up with your PCP within 1 week of discharge for further management and monitoring of lipid and cholesterol panels. Continue antibiotics Cipro 500 mg oral 2xdaily till 6/14/18 as directed and monitor for signs/symptoms of infection, such as fever/chills, burning/pain with urination, urinary frequency/hesitancy, cloudy urine, or blood in urine.  Follow up with PCP as outpatient for further management Your Creatinine as of today is 1.47.   Your home lisinopril and Eplerenone was held in the hospital. Please follow up with PCP or Card to follow up for routine labs and when to resume Lisinopril and Eplerenone Continue your medications as directed and please follow-up as an outpatient with your primary care provider and Urologist for further care and recommendations. You are not on anticoagulation meds, despite CHADS score of 2 (age > 75 + HTN).   Unclear if there is history of major bleeding. For now continue with Aspirin 81mg and address possible addition of Apixaban for stroke prevention as out-pt.  Continue your medications as directed and please follow-up as an outpatient with your primary care provider for further care and recommendations. Continue blood pressure medication regimen Norvasc and Bystolic. Holding home lisinopril due to recent ANITA, consider resuming as out pt if repeat renal function stable. Monitor for any visual changes, headaches or dizziness.  Monitor blood pressure regularly.    Follow up with your PCP for further management for high blood pressure.

## 2018-06-04 LAB
-  AMIKACIN: SIGNIFICANT CHANGE UP
-  AMPICILLIN/SULBACTAM: SIGNIFICANT CHANGE UP
-  AMPICILLIN: SIGNIFICANT CHANGE UP
-  AZTREONAM: SIGNIFICANT CHANGE UP
-  CEFAZOLIN: SIGNIFICANT CHANGE UP
-  CEFEPIME: SIGNIFICANT CHANGE UP
-  CEFOXITIN: SIGNIFICANT CHANGE UP
-  CEFTAZIDIME: SIGNIFICANT CHANGE UP
-  CEFTRIAXONE: SIGNIFICANT CHANGE UP
-  ERTAPENEM: SIGNIFICANT CHANGE UP
-  GENTAMICIN: SIGNIFICANT CHANGE UP
-  IMIPENEM: SIGNIFICANT CHANGE UP
-  LEVOFLOXACIN: SIGNIFICANT CHANGE UP
-  MEROPENEM: SIGNIFICANT CHANGE UP
-  NITROFURANTOIN: SIGNIFICANT CHANGE UP
-  PIPERACILLIN/TAZOBACTAM: SIGNIFICANT CHANGE UP
-  TIGECYCLINE: SIGNIFICANT CHANGE UP
-  TOBRAMYCIN: SIGNIFICANT CHANGE UP
-  TRIMETHOPRIM/SULFAMETHOXAZOLE: SIGNIFICANT CHANGE UP
BACTERIA UR CULT: SIGNIFICANT CHANGE UP
BUN SERPL-MCNC: 23 MG/DL — SIGNIFICANT CHANGE UP (ref 7–23)
CALCIUM SERPL-MCNC: 8.6 MG/DL — SIGNIFICANT CHANGE UP (ref 8.4–10.5)
CHLORIDE SERPL-SCNC: 99 MMOL/L — SIGNIFICANT CHANGE UP (ref 98–107)
CO2 SERPL-SCNC: 22 MMOL/L — SIGNIFICANT CHANGE UP (ref 22–31)
CREAT SERPL-MCNC: 1.66 MG/DL — HIGH (ref 0.5–1.3)
GLUCOSE SERPL-MCNC: 99 MG/DL — SIGNIFICANT CHANGE UP (ref 70–99)
HCT VFR BLD CALC: 34.3 % — LOW (ref 39–50)
HGB BLD-MCNC: 11.3 G/DL — LOW (ref 13–17)
MCHC RBC-ENTMCNC: 29.2 PG — SIGNIFICANT CHANGE UP (ref 27–34)
MCHC RBC-ENTMCNC: 32.9 % — SIGNIFICANT CHANGE UP (ref 32–36)
MCV RBC AUTO: 88.6 FL — SIGNIFICANT CHANGE UP (ref 80–100)
METHOD TYPE: SIGNIFICANT CHANGE UP
NRBC # FLD: 0 — SIGNIFICANT CHANGE UP
ORGANISM # SPEC MICROSCOPIC CNT: SIGNIFICANT CHANGE UP
PLATELET # BLD AUTO: 159 K/UL — SIGNIFICANT CHANGE UP (ref 150–400)
PMV BLD: 10.9 FL — SIGNIFICANT CHANGE UP (ref 7–13)
POTASSIUM SERPL-MCNC: 4.2 MMOL/L — SIGNIFICANT CHANGE UP (ref 3.5–5.3)
POTASSIUM SERPL-SCNC: 4.2 MMOL/L — SIGNIFICANT CHANGE UP (ref 3.5–5.3)
RBC # BLD: 3.87 M/UL — LOW (ref 4.2–5.8)
RBC # FLD: 14.2 % — SIGNIFICANT CHANGE UP (ref 10.3–14.5)
SODIUM SERPL-SCNC: 137 MMOL/L — SIGNIFICANT CHANGE UP (ref 135–145)
WBC # BLD: 6.29 K/UL — SIGNIFICANT CHANGE UP (ref 3.8–10.5)
WBC # FLD AUTO: 6.29 K/UL — SIGNIFICANT CHANGE UP (ref 3.8–10.5)

## 2018-06-04 PROCEDURE — 99222 1ST HOSP IP/OBS MODERATE 55: CPT

## 2018-06-04 PROCEDURE — 99233 SBSQ HOSP IP/OBS HIGH 50: CPT

## 2018-06-04 RX ORDER — SENNA PLUS 8.6 MG/1
2 TABLET ORAL AT BEDTIME
Qty: 0 | Refills: 0 | Status: DISCONTINUED | OUTPATIENT
Start: 2018-06-04 | End: 2018-06-05

## 2018-06-04 RX ORDER — POLYETHYLENE GLYCOL 3350 17 G/17G
17 POWDER, FOR SOLUTION ORAL DAILY
Qty: 0 | Refills: 0 | Status: DISCONTINUED | OUTPATIENT
Start: 2018-06-04 | End: 2018-06-05

## 2018-06-04 RX ORDER — SODIUM CHLORIDE 9 MG/ML
1000 INJECTION INTRAMUSCULAR; INTRAVENOUS; SUBCUTANEOUS
Qty: 0 | Refills: 0 | Status: DISCONTINUED | OUTPATIENT
Start: 2018-06-04 | End: 2018-06-07

## 2018-06-04 RX ORDER — CEFTRIAXONE 500 MG/1
2 INJECTION, POWDER, FOR SOLUTION INTRAMUSCULAR; INTRAVENOUS EVERY 24 HOURS
Qty: 0 | Refills: 0 | Status: DISCONTINUED | OUTPATIENT
Start: 2018-06-04 | End: 2018-06-05

## 2018-06-04 RX ADMIN — POLYETHYLENE GLYCOL 3350 17 GRAM(S): 17 POWDER, FOR SOLUTION ORAL at 17:55

## 2018-06-04 RX ADMIN — NEBIVOLOL HYDROCHLORIDE 20 MILLIGRAM(S): 5 TABLET ORAL at 06:26

## 2018-06-04 RX ADMIN — SENNA PLUS 2 TABLET(S): 8.6 TABLET ORAL at 21:58

## 2018-06-04 RX ADMIN — Medication 150 MILLIGRAM(S): at 06:25

## 2018-06-04 RX ADMIN — Medication 300 MILLIGRAM(S): at 21:58

## 2018-06-04 RX ADMIN — GABAPENTIN 300 MILLIGRAM(S): 400 CAPSULE ORAL at 21:58

## 2018-06-04 RX ADMIN — Medication 5 MILLIGRAM(S): at 06:25

## 2018-06-04 RX ADMIN — PANTOPRAZOLE SODIUM 40 MILLIGRAM(S): 20 TABLET, DELAYED RELEASE ORAL at 06:25

## 2018-06-04 RX ADMIN — SODIUM CHLORIDE 75 MILLILITER(S): 9 INJECTION INTRAMUSCULAR; INTRAVENOUS; SUBCUTANEOUS at 10:47

## 2018-06-04 RX ADMIN — HEPARIN SODIUM 5000 UNIT(S): 5000 INJECTION INTRAVENOUS; SUBCUTANEOUS at 06:26

## 2018-06-04 RX ADMIN — AMLODIPINE BESYLATE 10 MILLIGRAM(S): 2.5 TABLET ORAL at 06:25

## 2018-06-04 RX ADMIN — Medication 81 MILLIGRAM(S): at 14:22

## 2018-06-04 RX ADMIN — HEPARIN SODIUM 5000 UNIT(S): 5000 INJECTION INTRAVENOUS; SUBCUTANEOUS at 14:22

## 2018-06-04 RX ADMIN — CEFTRIAXONE 100 GRAM(S): 500 INJECTION, POWDER, FOR SOLUTION INTRAMUSCULAR; INTRAVENOUS at 14:23

## 2018-06-04 RX ADMIN — SIMVASTATIN 10 MILLIGRAM(S): 20 TABLET, FILM COATED ORAL at 21:58

## 2018-06-04 RX ADMIN — Medication 5 MILLIGRAM(S): at 17:50

## 2018-06-04 RX ADMIN — HEPARIN SODIUM 5000 UNIT(S): 5000 INJECTION INTRAVENOUS; SUBCUTANEOUS at 21:58

## 2018-06-04 NOTE — PROGRESS NOTE ADULT - PROBLEM SELECTOR PLAN 3
- c/w amlodipine, bystolic  - holding home lisinopril - c/w amlodipine, Bystolic  - holding home lisinopril

## 2018-06-04 NOTE — PROGRESS NOTE ADULT - PROBLEM SELECTOR PLAN 1
- Due to E.coli, now with bacteremia, repeat Bcx and sensitivities pending  - change Rocephin to 2gm IV daily, ID eval   -course pending repeat BCx

## 2018-06-04 NOTE — CONSULT NOTE ADULT - SUBJECTIVE AND OBJECTIVE BOX
HPI:  73M w/ PMH of prostate cancer s/p brachytherapy (2007) and TURP (2015), HTN, HLD, AFib (not on anticoagulation, loop recorder implanted 3 months ago) presenting with urinary frequency, suprapubic pressure and low back pain.  Patient states that he started noticing increased urinary frequency and urgency about 1 week ago, denies any dysuria or hematuria.  Back pain started a few days later, worse on his left side.  Last night he had a fever to 100 and this morning had a fever and rigors which prompted him to come to the ED.  States that he currently feels better compared to when he came to the ED.  Denies any nausea, vomiting, diarrhea.  Has been able to eat and drink normally.     In the ED, febrile to 100.9, HR 50-60, -140/60-70, satting 100% on room air.   He received tylenol, 1L NS and ceftriaxone 1g. (02 Jun 2018 15:05)    ID- above reviewed.  rigors 6/2 after several days of dysuria and left flank pain.  now feels better.  weak.    PAST MEDICAL & SURGICAL HISTORY:  Prostate disease: uretheral stricture  GERD (gastroesophageal reflux disease)  Cervical disc disease: F/u with neurology - currently going to  Physical therapy  H/O prostate cancer: s/p brachy therapy ( 2007 )  BPH (benign prostatic hyperplasia)  Afib: controlled with  flecainide  HLD (hyperlipidemia)  HTN (hypertension)  Sleep apnea: ON CPAP  History of hyperbaric oxygen therapy: 2014  S/P TURP: 2007, 2014 and 2015  S/P hernia surgery: left Inguinal ( 2015 )  S/P arthroscopy of shoulder: 2008      Allergies    codeine (Pruritus)    Intolerances        ANTIMICROBIALS:  cefTRIAXone   IVPB 2 every 24 hours      OTHER MEDS:  acetaminophen   Tablet 650 milliGRAM(s) Oral every 6 hours PRN  amLODIPine   Tablet 10 milliGRAM(s) Oral daily  aspirin  chewable 81 milliGRAM(s) Oral daily  docusate sodium 300 milliGRAM(s) Oral at bedtime  flecainide 150 milliGRAM(s) Oral every 12 hours  gabapentin 300 milliGRAM(s) Oral at bedtime  heparin  Injectable 5000 Unit(s) SubCutaneous every 8 hours  nebivolol 20 milliGRAM(s) Oral daily  oxybutynin 5 milliGRAM(s) Oral two times a day  pantoprazole    Tablet 40 milliGRAM(s) Oral before breakfast  polyethylene glycol 3350 17 Gram(s) Oral daily  senna 2 Tablet(s) Oral at bedtime  simvastatin 10 milliGRAM(s) Oral at bedtime  sodium chloride 0.9%. 1000 milliLiter(s) IV Continuous <Continuous>      SOCIAL HISTORY: retired  [employer]    FAMILY HISTORY: non contib      REVIEW OF SYSTEMS  [  ] ROS unobtainable because:    [x  ] All other systems negative except as noted below:	    Constitutional:  [ ] fever [x ] chills  [ ] weight loss  [x ] weakness  Skin:  [ ] rash [ ] phlebitis	  Eyes: [ ] icterus [ ] pain  [ ] discharge	  ENMT: [ ] sore throat  [ ] thrush [ ] ulcers [ ] exudates  Respiratory: [ ] dyspnea [ ] hemoptysis [ ] cough [ ] sputum	  Cardiovascular:  [ ] chest pain [ ] palpitations [ ] edema	  Gastrointestinal:  [ ] nausea [ ] vomiting [ ] diarrhea [ ] constipation [ ] pain	  Genitourinary:  [x ] dysuria [x ] frequency [ ] hematuria [ ] discharge [x ] flank pain  [ ] incontinence  Musculoskeletal:  [ ] myalgias [ ] arthralgias [ ] arthritis  [ ] back pain  Neurological:  [ ] headache [ ] seizures  [ ] confusion/altered mental status  Psychiatric:  [ ] anxiety [ ] depression	  Hematology/Lymphatics:  [ ] lymphadenopathy  Endocrine:  [ ] adrenal [ ] thyroid  Allergic/Immunologic:	 [ ] transplant [ ] seasonal    PHYSICAL EXAM:  General: [x ] non-toxic  HEAD/EYES: [ ] PERRL [x ] white sclera [ ] icterus  ENT:  [ ] normal [x ] supple [ ] thrush [ ] pharyngeal exudate  Cardiovascular:   [ ] murmur [x ] distant normal [ ] PPM/AICD  Respiratory:  [x ] clear to ausculation bilaterally  GI:  [x ] soft, non-tender, normal bowel sounds  :  [ ] cash [x ] no CVA tenderness   Musculoskeletal:  [x ] no synovitis  Neurologic:  [x ] non-focal exam   Skin:  [x ] no rash  Lymph: [ ] no lymphadenopathy  Psychiatric:  [ ] appropriate affect [ ] alert & oriented  Lines:  [x ] no phlebitis iv right hand [ ] central line          Drug Dosing Weight  Height (cm): 182.88 (03 Jun 2018 07:31)  Weight (kg): 141.5 (03 Jun 2018 07:31)  BMI (kg/m2): 42.3 (03 Jun 2018 07:31)  BSA (m2): 2.57 (03 Jun 2018 07:31)    Vital Signs Last 24 Hrs  T(F): 98.1 (06-04-18 @ 12:36), Max: 100.9 (06-02-18 @ 10:59)    Vital Signs Last 24 Hrs  HR: 50 (06-04-18 @ 12:36) (50 - 60)  BP: 109/69 (06-04-18 @ 12:36) (109/69 - 137/85)  RR: 18 (06-04-18 @ 12:36)  SpO2: 100% (06-04-18 @ 12:36) (96% - 100%)  Wt(kg): --                          11.3   6.29  )-----------( 159      ( 04 Jun 2018 05:35 )             34.3       06-04    137  |  99  |  23  ----------------------------<  99  4.2   |  22  |  1.66<H>    Ca    8.6      04 Jun 2018 05:35  Phos  3.4     06-02  Mg     1.9     06-02    TPro  6.7  /  Alb  3.4  /  TBili  0.6  /  DBili  x   /  AST  12  /  ALT  8   /  AlkPhos  47  06-03          MICROBIOLOGY:  BLOOD PERIPHERAL  06-02-18 --  --  --      BLOOD VENOUS  06-02-18 --  --  BLOOD CULTURE PCR  Escherichia coli      URINE MIDSTREAM  06-02-18 --  --  Escherichia coli      RADIOLOGY:    < from: Xray Chest 2 Views PA/Lat (06.02.18 @ 12:21) >  EXAM:  Frontal and lateral chest from 6/2/2018 at 1221. No prior chest x-ray   available at this institution for comparison.    IMPRESSION:  Implanted loop recorder present over medial mid anterior left hemithorax.     Clear lungs. No pleural effusions or pneumothorax.    Cardiac and mediastinal silhouettes within normal limits.    Trachea midline.    Generalized mild osteopenia and mild spinal degenerative changes.    < end of copied text >

## 2018-06-04 NOTE — PROGRESS NOTE ADULT - PROBLEM SELECTOR PLAN 5
heparin 5000u SQ TID for VTE ppx. If renal function continues to improve can transition to daily lovenox.

## 2018-06-04 NOTE — PROGRESS NOTE ADULT - PROBLEM SELECTOR PLAN 5
heparin 5000u SQ TID for VTE ppx. heparin 5000u SQ TID for VTE ppx.  constipation - will start bowel regimen

## 2018-06-04 NOTE — PROGRESS NOTE ADULT - PROBLEM SELECTOR PLAN 2
Increase in creatinine to 1.9 (baseline ~ 1.4) likely prerenal (improved to 1.67 with fluids).  - encourage po hydration  - holding home lisinopril and eplerenone

## 2018-06-04 NOTE — PROGRESS NOTE ADULT - PROBLEM SELECTOR PLAN 2
Increase in creatinine to 1.9 (baseline ~ 1.4) likely prerenal (improved to 1.67 with fluids).  - resolving, resume IVF fore now until back to baseline   - holding home lisinopril and eplerenone Increase in creatinine to 1.9 (baseline ~ 1.4) likely prerenal (improved to 1.67 with fluids).  - resolving, resume IVF for now until Cr. back to baseline   - holding home lisinopril and eplerenone

## 2018-06-04 NOTE — PROGRESS NOTE ADULT - PROBLEM SELECTOR PLAN 1
Due to E.coli, now with bacteremia. Continue empiric ceftriaxone and f/u UCs sensitivities. Repeat BCx 6/4.

## 2018-06-04 NOTE — CONSULT NOTE ADULT - ASSESSMENT
E coli urosepsis in 72 yo man with prostate cancer, s/p TURP x 3, radiation seeds, presenting with fever, rigors, urinary frequency, left flank pain.    Blood culture and urine culture with E coli.    Suggest: follow blood culture susceptibilities               continue ceftriaxone 2 gm iv q 24 hours

## 2018-06-05 LAB
-  AMIKACIN: SIGNIFICANT CHANGE UP
-  AMPICILLIN/SULBACTAM: SIGNIFICANT CHANGE UP
-  AMPICILLIN: SIGNIFICANT CHANGE UP
-  AZTREONAM: SIGNIFICANT CHANGE UP
-  CEFAZOLIN: SIGNIFICANT CHANGE UP
-  CEFEPIME: SIGNIFICANT CHANGE UP
-  CEFOXITIN: SIGNIFICANT CHANGE UP
-  CEFTAZIDIME: SIGNIFICANT CHANGE UP
-  CEFTRIAXONE: SIGNIFICANT CHANGE UP
-  CIPROFLOXACIN: SIGNIFICANT CHANGE UP
-  ERTAPENEM: SIGNIFICANT CHANGE UP
-  GENTAMICIN: SIGNIFICANT CHANGE UP
-  IMIPENEM: SIGNIFICANT CHANGE UP
-  LEVOFLOXACIN: SIGNIFICANT CHANGE UP
-  MEROPENEM: SIGNIFICANT CHANGE UP
-  PIPERACILLIN/TAZOBACTAM: SIGNIFICANT CHANGE UP
-  TIGECYCLINE: SIGNIFICANT CHANGE UP
-  TOBRAMYCIN: SIGNIFICANT CHANGE UP
-  TRIMETHOPRIM/SULFAMETHOXAZOLE: SIGNIFICANT CHANGE UP
BACTERIA BLD CULT: SIGNIFICANT CHANGE UP
BACTERIA BLD CULT: SIGNIFICANT CHANGE UP
BUN SERPL-MCNC: 21 MG/DL — SIGNIFICANT CHANGE UP (ref 7–23)
CALCIUM SERPL-MCNC: 8.6 MG/DL — SIGNIFICANT CHANGE UP (ref 8.4–10.5)
CHLORIDE SERPL-SCNC: 102 MMOL/L — SIGNIFICANT CHANGE UP (ref 98–107)
CO2 SERPL-SCNC: 24 MMOL/L — SIGNIFICANT CHANGE UP (ref 22–31)
CREAT SERPL-MCNC: 1.6 MG/DL — HIGH (ref 0.5–1.3)
E COLI DNA BLD POS QL NAA+NON-PROBE: SIGNIFICANT CHANGE UP
GLUCOSE SERPL-MCNC: 96 MG/DL — SIGNIFICANT CHANGE UP (ref 70–99)
METHOD TYPE: SIGNIFICANT CHANGE UP
POTASSIUM SERPL-MCNC: 4.7 MMOL/L — SIGNIFICANT CHANGE UP (ref 3.5–5.3)
POTASSIUM SERPL-SCNC: 4.7 MMOL/L — SIGNIFICANT CHANGE UP (ref 3.5–5.3)
SODIUM SERPL-SCNC: 139 MMOL/L — SIGNIFICANT CHANGE UP (ref 135–145)
SPECIMEN SOURCE: SIGNIFICANT CHANGE UP
SPECIMEN SOURCE: SIGNIFICANT CHANGE UP

## 2018-06-05 PROCEDURE — 99233 SBSQ HOSP IP/OBS HIGH 50: CPT

## 2018-06-05 PROCEDURE — 99232 SBSQ HOSP IP/OBS MODERATE 35: CPT

## 2018-06-05 RX ORDER — LACTULOSE 10 G/15ML
10 SOLUTION ORAL EVERY 8 HOURS
Qty: 0 | Refills: 0 | Status: DISCONTINUED | OUTPATIENT
Start: 2018-06-05 | End: 2018-06-05

## 2018-06-05 RX ADMIN — LACTULOSE 10 GRAM(S): 10 SOLUTION ORAL at 14:11

## 2018-06-05 RX ADMIN — HEPARIN SODIUM 5000 UNIT(S): 5000 INJECTION INTRAVENOUS; SUBCUTANEOUS at 22:01

## 2018-06-05 RX ADMIN — SODIUM CHLORIDE 75 MILLILITER(S): 9 INJECTION INTRAMUSCULAR; INTRAVENOUS; SUBCUTANEOUS at 12:54

## 2018-06-05 RX ADMIN — SODIUM CHLORIDE 75 MILLILITER(S): 9 INJECTION INTRAMUSCULAR; INTRAVENOUS; SUBCUTANEOUS at 22:01

## 2018-06-05 RX ADMIN — HEPARIN SODIUM 5000 UNIT(S): 5000 INJECTION INTRAVENOUS; SUBCUTANEOUS at 06:11

## 2018-06-05 RX ADMIN — PANTOPRAZOLE SODIUM 40 MILLIGRAM(S): 20 TABLET, DELAYED RELEASE ORAL at 06:10

## 2018-06-05 RX ADMIN — Medication 5 MILLIGRAM(S): at 17:56

## 2018-06-05 RX ADMIN — AMLODIPINE BESYLATE 10 MILLIGRAM(S): 2.5 TABLET ORAL at 06:10

## 2018-06-05 RX ADMIN — HEPARIN SODIUM 5000 UNIT(S): 5000 INJECTION INTRAVENOUS; SUBCUTANEOUS at 14:09

## 2018-06-05 RX ADMIN — POLYETHYLENE GLYCOL 3350 17 GRAM(S): 17 POWDER, FOR SOLUTION ORAL at 14:09

## 2018-06-05 RX ADMIN — Medication 81 MILLIGRAM(S): at 14:10

## 2018-06-05 RX ADMIN — Medication 5 MILLIGRAM(S): at 06:10

## 2018-06-05 RX ADMIN — SIMVASTATIN 10 MILLIGRAM(S): 20 TABLET, FILM COATED ORAL at 22:01

## 2018-06-05 RX ADMIN — CEFTRIAXONE 100 GRAM(S): 500 INJECTION, POWDER, FOR SOLUTION INTRAMUSCULAR; INTRAVENOUS at 14:09

## 2018-06-05 NOTE — PROGRESS NOTE ADULT - PROBLEM SELECTOR PLAN 1
- Due to E.coli, now with bacteremia, repeat Bcx neg and pan-sensitive   - on Rocephin to 2gm IV daily  - will follow up with ID on switching to PO and possible DC planning

## 2018-06-05 NOTE — PROGRESS NOTE ADULT - PROBLEM SELECTOR PLAN 2
Increase in creatinine to 1.9 (baseline ~ 1.4) likely prerenal (improved to 1.67 with fluids).  - resolving, resume IVF for now until Cr. back to baseline   - holding home lisinopril and eplerenone

## 2018-06-06 LAB
BASOPHILS # BLD AUTO: 0.02 K/UL — SIGNIFICANT CHANGE UP (ref 0–0.2)
BASOPHILS NFR BLD AUTO: 0.4 % — SIGNIFICANT CHANGE UP (ref 0–2)
BUN SERPL-MCNC: 17 MG/DL — SIGNIFICANT CHANGE UP (ref 7–23)
CALCIUM SERPL-MCNC: 8.7 MG/DL — SIGNIFICANT CHANGE UP (ref 8.4–10.5)
CHLORIDE SERPL-SCNC: 101 MMOL/L — SIGNIFICANT CHANGE UP (ref 98–107)
CO2 SERPL-SCNC: 24 MMOL/L — SIGNIFICANT CHANGE UP (ref 22–31)
CREAT SERPL-MCNC: 1.46 MG/DL — HIGH (ref 0.5–1.3)
EOSINOPHIL # BLD AUTO: 0.27 K/UL — SIGNIFICANT CHANGE UP (ref 0–0.5)
EOSINOPHIL NFR BLD AUTO: 5.3 % — SIGNIFICANT CHANGE UP (ref 0–6)
GLUCOSE SERPL-MCNC: 95 MG/DL — SIGNIFICANT CHANGE UP (ref 70–99)
HCT VFR BLD CALC: 34.6 % — LOW (ref 39–50)
HGB BLD-MCNC: 11.2 G/DL — LOW (ref 13–17)
IMM GRANULOCYTES # BLD AUTO: 0.04 # — SIGNIFICANT CHANGE UP
IMM GRANULOCYTES NFR BLD AUTO: 0.8 % — SIGNIFICANT CHANGE UP (ref 0–1.5)
LYMPHOCYTES # BLD AUTO: 1.6 K/UL — SIGNIFICANT CHANGE UP (ref 1–3.3)
LYMPHOCYTES # BLD AUTO: 31.7 % — SIGNIFICANT CHANGE UP (ref 13–44)
MCHC RBC-ENTMCNC: 28.8 PG — SIGNIFICANT CHANGE UP (ref 27–34)
MCHC RBC-ENTMCNC: 32.4 % — SIGNIFICANT CHANGE UP (ref 32–36)
MCV RBC AUTO: 88.9 FL — SIGNIFICANT CHANGE UP (ref 80–100)
MONOCYTES # BLD AUTO: 0.69 K/UL — SIGNIFICANT CHANGE UP (ref 0–0.9)
MONOCYTES NFR BLD AUTO: 13.7 % — SIGNIFICANT CHANGE UP (ref 2–14)
NEUTROPHILS # BLD AUTO: 2.43 K/UL — SIGNIFICANT CHANGE UP (ref 1.8–7.4)
NEUTROPHILS NFR BLD AUTO: 48.1 % — SIGNIFICANT CHANGE UP (ref 43–77)
NRBC # FLD: 0 — SIGNIFICANT CHANGE UP
PLATELET # BLD AUTO: 174 K/UL — SIGNIFICANT CHANGE UP (ref 150–400)
PMV BLD: 10.2 FL — SIGNIFICANT CHANGE UP (ref 7–13)
POTASSIUM SERPL-MCNC: 4.5 MMOL/L — SIGNIFICANT CHANGE UP (ref 3.5–5.3)
POTASSIUM SERPL-SCNC: 4.5 MMOL/L — SIGNIFICANT CHANGE UP (ref 3.5–5.3)
RBC # BLD: 3.89 M/UL — LOW (ref 4.2–5.8)
RBC # FLD: 13.9 % — SIGNIFICANT CHANGE UP (ref 10.3–14.5)
SODIUM SERPL-SCNC: 137 MMOL/L — SIGNIFICANT CHANGE UP (ref 135–145)
WBC # BLD: 5.05 K/UL — SIGNIFICANT CHANGE UP (ref 3.8–10.5)
WBC # FLD AUTO: 5.05 K/UL — SIGNIFICANT CHANGE UP (ref 3.8–10.5)

## 2018-06-06 PROCEDURE — 99233 SBSQ HOSP IP/OBS HIGH 50: CPT

## 2018-06-06 PROCEDURE — 99232 SBSQ HOSP IP/OBS MODERATE 35: CPT

## 2018-06-06 RX ORDER — ACETAMINOPHEN 500 MG
650 TABLET ORAL ONCE
Qty: 0 | Refills: 0 | Status: COMPLETED | OUTPATIENT
Start: 2018-06-06 | End: 2018-06-06

## 2018-06-06 RX ADMIN — SODIUM CHLORIDE 75 MILLILITER(S): 9 INJECTION INTRAMUSCULAR; INTRAVENOUS; SUBCUTANEOUS at 05:29

## 2018-06-06 RX ADMIN — PANTOPRAZOLE SODIUM 40 MILLIGRAM(S): 20 TABLET, DELAYED RELEASE ORAL at 05:29

## 2018-06-06 RX ADMIN — SIMVASTATIN 10 MILLIGRAM(S): 20 TABLET, FILM COATED ORAL at 21:37

## 2018-06-06 RX ADMIN — HEPARIN SODIUM 5000 UNIT(S): 5000 INJECTION INTRAVENOUS; SUBCUTANEOUS at 14:12

## 2018-06-06 RX ADMIN — LACTULOSE 10 GRAM(S): 10 SOLUTION ORAL at 14:13

## 2018-06-06 RX ADMIN — Medication 300 MILLIGRAM(S): at 21:39

## 2018-06-06 RX ADMIN — Medication 5 MILLIGRAM(S): at 18:19

## 2018-06-06 RX ADMIN — HEPARIN SODIUM 5000 UNIT(S): 5000 INJECTION INTRAVENOUS; SUBCUTANEOUS at 21:37

## 2018-06-06 RX ADMIN — POLYETHYLENE GLYCOL 3350 17 GRAM(S): 17 POWDER, FOR SOLUTION ORAL at 14:12

## 2018-06-06 RX ADMIN — Medication 650 MILLIGRAM(S): at 05:52

## 2018-06-06 RX ADMIN — CEFTRIAXONE 100 GRAM(S): 500 INJECTION, POWDER, FOR SOLUTION INTRAMUSCULAR; INTRAVENOUS at 14:12

## 2018-06-06 RX ADMIN — Medication 5 MILLIGRAM(S): at 05:29

## 2018-06-06 RX ADMIN — AMLODIPINE BESYLATE 10 MILLIGRAM(S): 2.5 TABLET ORAL at 05:29

## 2018-06-06 RX ADMIN — SENNA PLUS 2 TABLET(S): 8.6 TABLET ORAL at 21:39

## 2018-06-06 RX ADMIN — Medication 650 MILLIGRAM(S): at 06:50

## 2018-06-06 RX ADMIN — SODIUM CHLORIDE 75 MILLILITER(S): 9 INJECTION INTRAMUSCULAR; INTRAVENOUS; SUBCUTANEOUS at 21:37

## 2018-06-06 RX ADMIN — Medication 81 MILLIGRAM(S): at 14:13

## 2018-06-06 RX ADMIN — HEPARIN SODIUM 5000 UNIT(S): 5000 INJECTION INTRAVENOUS; SUBCUTANEOUS at 05:29

## 2018-06-06 NOTE — PROGRESS NOTE ADULT - PROBLEM SELECTOR PLAN 1
- Due to E.coli, now with bacteremia, repeat Bcx neg and pan-sensitive   - on Rocephin to 2gm IV daily  - will follow up with ID on switching to PO and possible DC planning  will notify urology about Hospitalization

## 2018-06-06 NOTE — PROGRESS NOTE ADULT - PROBLEM SELECTOR PLAN 2
Increase in creatinine to 1.9 (baseline ~ 1.4) likely prerenal (  cr trending down , now 1.46   - resolving, resume IVF for now until Cr. back to baseline   - holding home lisinopril and eplerenone

## 2018-06-07 VITALS
OXYGEN SATURATION: 99 % | TEMPERATURE: 98 F | SYSTOLIC BLOOD PRESSURE: 136 MMHG | RESPIRATION RATE: 18 BRPM | DIASTOLIC BLOOD PRESSURE: 76 MMHG | HEART RATE: 58 BPM

## 2018-06-07 LAB
BASOPHILS # BLD AUTO: 0.03 K/UL — SIGNIFICANT CHANGE UP (ref 0–0.2)
BASOPHILS NFR BLD AUTO: 0.6 % — SIGNIFICANT CHANGE UP (ref 0–2)
BUN SERPL-MCNC: 14 MG/DL — SIGNIFICANT CHANGE UP (ref 7–23)
CALCIUM SERPL-MCNC: 9.4 MG/DL — SIGNIFICANT CHANGE UP (ref 8.4–10.5)
CHLORIDE SERPL-SCNC: 104 MMOL/L — SIGNIFICANT CHANGE UP (ref 98–107)
CO2 SERPL-SCNC: 24 MMOL/L — SIGNIFICANT CHANGE UP (ref 22–31)
CREAT SERPL-MCNC: 1.47 MG/DL — HIGH (ref 0.5–1.3)
EOSINOPHIL # BLD AUTO: 0.23 K/UL — SIGNIFICANT CHANGE UP (ref 0–0.5)
EOSINOPHIL NFR BLD AUTO: 4.2 % — SIGNIFICANT CHANGE UP (ref 0–6)
GLUCOSE SERPL-MCNC: 88 MG/DL — SIGNIFICANT CHANGE UP (ref 70–99)
HCT VFR BLD CALC: 34.5 % — LOW (ref 39–50)
HGB BLD-MCNC: 11.4 G/DL — LOW (ref 13–17)
IMM GRANULOCYTES # BLD AUTO: 0.04 # — SIGNIFICANT CHANGE UP
IMM GRANULOCYTES NFR BLD AUTO: 0.7 % — SIGNIFICANT CHANGE UP (ref 0–1.5)
LYMPHOCYTES # BLD AUTO: 1.84 K/UL — SIGNIFICANT CHANGE UP (ref 1–3.3)
LYMPHOCYTES # BLD AUTO: 33.9 % — SIGNIFICANT CHANGE UP (ref 13–44)
MCHC RBC-ENTMCNC: 28.4 PG — SIGNIFICANT CHANGE UP (ref 27–34)
MCHC RBC-ENTMCNC: 33 % — SIGNIFICANT CHANGE UP (ref 32–36)
MCV RBC AUTO: 86 FL — SIGNIFICANT CHANGE UP (ref 80–100)
MONOCYTES # BLD AUTO: 0.74 K/UL — SIGNIFICANT CHANGE UP (ref 0–0.9)
MONOCYTES NFR BLD AUTO: 13.6 % — SIGNIFICANT CHANGE UP (ref 2–14)
NEUTROPHILS # BLD AUTO: 2.55 K/UL — SIGNIFICANT CHANGE UP (ref 1.8–7.4)
NEUTROPHILS NFR BLD AUTO: 47 % — SIGNIFICANT CHANGE UP (ref 43–77)
NRBC # FLD: 0 — SIGNIFICANT CHANGE UP
PLATELET # BLD AUTO: 177 K/UL — SIGNIFICANT CHANGE UP (ref 150–400)
PMV BLD: 10.5 FL — SIGNIFICANT CHANGE UP (ref 7–13)
POTASSIUM SERPL-MCNC: 5.1 MMOL/L — SIGNIFICANT CHANGE UP (ref 3.5–5.3)
POTASSIUM SERPL-SCNC: 5.1 MMOL/L — SIGNIFICANT CHANGE UP (ref 3.5–5.3)
RBC # BLD: 4.01 M/UL — LOW (ref 4.2–5.8)
RBC # FLD: 13.9 % — SIGNIFICANT CHANGE UP (ref 10.3–14.5)
SODIUM SERPL-SCNC: 141 MMOL/L — SIGNIFICANT CHANGE UP (ref 135–145)
WBC # BLD: 5.43 K/UL — SIGNIFICANT CHANGE UP (ref 3.8–10.5)
WBC # FLD AUTO: 5.43 K/UL — SIGNIFICANT CHANGE UP (ref 3.8–10.5)

## 2018-06-07 PROCEDURE — 99239 HOSP IP/OBS DSCHRG MGMT >30: CPT

## 2018-06-07 RX ORDER — GABAPENTIN 400 MG/1
1 CAPSULE ORAL
Qty: 30 | Refills: 0 | OUTPATIENT
Start: 2018-06-07 | End: 2018-07-06

## 2018-06-07 RX ORDER — OXYBUTYNIN CHLORIDE 5 MG
1 TABLET ORAL
Qty: 0 | Refills: 0 | COMMUNITY
Start: 2018-06-07

## 2018-06-07 RX ORDER — CIPROFLOXACIN LACTATE 400MG/40ML
1 VIAL (ML) INTRAVENOUS
Qty: 16 | Refills: 0 | OUTPATIENT
Start: 2018-06-07 | End: 2018-06-14

## 2018-06-07 RX ORDER — LISINOPRIL 2.5 MG/1
1 TABLET ORAL
Qty: 0 | Refills: 0 | COMMUNITY

## 2018-06-07 RX ORDER — AMLODIPINE BESYLATE 2.5 MG/1
1 TABLET ORAL
Qty: 30 | Refills: 0 | OUTPATIENT
Start: 2018-06-07 | End: 2018-07-06

## 2018-06-07 RX ORDER — SENNA PLUS 8.6 MG/1
2 TABLET ORAL
Qty: 0 | Refills: 0 | COMMUNITY
Start: 2018-06-07

## 2018-06-07 RX ORDER — EPLERENONE 50 MG/1
1 TABLET, FILM COATED ORAL
Qty: 0 | Refills: 0 | COMMUNITY

## 2018-06-07 RX ADMIN — PANTOPRAZOLE SODIUM 40 MILLIGRAM(S): 20 TABLET, DELAYED RELEASE ORAL at 05:19

## 2018-06-07 RX ADMIN — AMLODIPINE BESYLATE 10 MILLIGRAM(S): 2.5 TABLET ORAL at 05:19

## 2018-06-07 RX ADMIN — CEFTRIAXONE 100 GRAM(S): 500 INJECTION, POWDER, FOR SOLUTION INTRAMUSCULAR; INTRAVENOUS at 11:25

## 2018-06-07 RX ADMIN — Medication 5 MILLIGRAM(S): at 05:19

## 2018-06-07 RX ADMIN — SODIUM CHLORIDE 75 MILLILITER(S): 9 INJECTION INTRAMUSCULAR; INTRAVENOUS; SUBCUTANEOUS at 08:04

## 2018-06-07 RX ADMIN — HEPARIN SODIUM 5000 UNIT(S): 5000 INJECTION INTRAVENOUS; SUBCUTANEOUS at 05:18

## 2018-06-07 NOTE — PROGRESS NOTE ADULT - SUBJECTIVE AND OBJECTIVE BOX
Follow Up:  urosepsis    Inverval History/ROS:  continues to feel improved.  no fever, chills. patient spoke to Dr. Art.    Allergies  codeine (Pruritus)        ANTIMICROBIALS:  cefTRIAXone   IVPB 2 every 24 hours      OTHER MEDS:  acetaminophen   Tablet 650 milliGRAM(s) Oral every 6 hours PRN  amLODIPine   Tablet 10 milliGRAM(s) Oral daily  aspirin  chewable 81 milliGRAM(s) Oral daily  docusate sodium 300 milliGRAM(s) Oral at bedtime  flecainide 150 milliGRAM(s) Oral every 12 hours  gabapentin 300 milliGRAM(s) Oral at bedtime  heparin  Injectable 5000 Unit(s) SubCutaneous every 8 hours  lactulose Syrup 10 Gram(s) Oral every 8 hours  nebivolol 20 milliGRAM(s) Oral daily  oxybutynin 5 milliGRAM(s) Oral two times a day  pantoprazole    Tablet 40 milliGRAM(s) Oral before breakfast  polyethylene glycol 3350 17 Gram(s) Oral daily  senna 2 Tablet(s) Oral at bedtime  simvastatin 10 milliGRAM(s) Oral at bedtime  sodium chloride 0.9%. 1000 milliLiter(s) IV Continuous <Continuous>      Vital Signs Last 24 Hrs  T(F): 98.2 (06-06-18 @ 14:34), Max: 98.2 (06-06-18 @ 14:34)  HR: 53 (06-06-18 @ 14:34)  BP: 144/81 (06-06-18 @ 14:34)  RR: 18 (06-06-18 @ 14:34)  SpO2: 96% (06-06-18 @ 14:34) (94% - 98%)    PHYSICAL EXAM:  General: [x ] non-toxic  HEAD/EYES: [ ] PERRL [x ] white sclera [ ] icterus  ENT:  [ ] normal [x ] supple [ ] thrush [ ] pharyngeal exudate  Cardiovascular:   [ ] murmur [x ] normal [ ] PPM/AICD  Respiratory:  [x ] clear to ausculation bilaterally  GI:  [x ] soft, non-tender, normal bowel sounds  :  [ ] cash [x ] no CVA tenderness   Musculoskeletal:  [ ] no synovitis  Neurologic:  [x ] non-focal exam   Skin:  [x ] no rash  Lymph: [ ] no lymphadenopathy  Psychiatric:  [x ] appropriate affect [x ] alert & oriented  Lines:  [ ] no phlebitis [ ] central line                                           11.2   5.05  )-----------( 174      ( 06 Jun 2018 05:37 )             34.6 06-06    137  |  101  |  17  ----------------------------<  95  4.5   |  24  |  1.46  Ca    8.7      06 Jun 2018 05:37              MICROBIOLOGY:  v  BLOOD PERIPHERAL  06-04-18 --  --  --      BLOOD PERIPHERAL  06-02-18 --  --  --      BLOOD VENOUS  06-02-18 --  --  BLOOD CULTURE PCR  Escherichia coli      URINE MIDSTREAM  06-02-18 --  --  Escherichia coli      RADIOLOGY:
Follow Up:  urosepsis    Inverval History/ROS: feels better.  no fever, chills last night.    Allergies  codeine (Pruritus)        ANTIMICROBIALS:  cefTRIAXone   IVPB 2 every 24 hours      OTHER MEDS:  acetaminophen   Tablet 650 milliGRAM(s) Oral every 6 hours PRN  amLODIPine   Tablet 10 milliGRAM(s) Oral daily  aspirin  chewable 81 milliGRAM(s) Oral daily  docusate sodium 300 milliGRAM(s) Oral at bedtime  flecainide 150 milliGRAM(s) Oral every 12 hours  gabapentin 300 milliGRAM(s) Oral at bedtime  heparin  Injectable 5000 Unit(s) SubCutaneous every 8 hours  lactulose Syrup 10 Gram(s) Oral every 8 hours  nebivolol 20 milliGRAM(s) Oral daily  oxybutynin 5 milliGRAM(s) Oral two times a day  pantoprazole    Tablet 40 milliGRAM(s) Oral before breakfast  polyethylene glycol 3350 17 Gram(s) Oral daily  senna 2 Tablet(s) Oral at bedtime  simvastatin 10 milliGRAM(s) Oral at bedtime  sodium chloride 0.9%. 1000 milliLiter(s) IV Continuous <Continuous>      Vital Signs Last 24 Hrs  T(C): 36.7 (05 Jun 2018 12:52), Max: 37.1 (04 Jun 2018 21:09)  T(F): 98.1 (05 Jun 2018 12:52), Max: 98.7 (04 Jun 2018 21:09)  HR: 55 (05 Jun 2018 12:52) (51 - 56)  BP: 137/77 (05 Jun 2018 12:52) (105/50 - 137/77)  BP(mean): --  RR: 18 (05 Jun 2018 12:52) (17 - 18)  SpO2: 97% (05 Jun 2018 12:52) (96% - 100%)    PHYSICAL EXAM:  General: [x ] non-toxic  HEAD/EYES: [ ] PERRL [x ] white sclera [ ] icterus  ENT:  [ ] normal [x ] supple [ ] thrush [ ] pharyngeal exudate  Cardiovascular:   [ ] murmur [x ] normal [ ] PPM/AICD  Respiratory:  [x ] clear to ausculation bilaterally  GI:  [x ] soft, non-tender, normal bowel sounds  :  [ ] cash [x ] no CVA tenderness   Musculoskeletal:  [ ] no synovitis  Neurologic:  [x ] non-focal exam   Skin:  [x ] no rash  Lymph: [ ] no lymphadenopathy  Psychiatric:  [x ] appropriate affect [x ] alert & oriented  Lines:  [ ] no phlebitis [ ] central line                                11.3   6.29  )-----------( 159      ( 04 Jun 2018 05:35 )             34.3       06-05    139  |  102  |  21  ----------------------------<  96  4.7   |  24  |  1.60<H>    Ca    8.6      05 Jun 2018 05:35            MICROBIOLOGY:  v  BLOOD PERIPHERAL  06-04-18 --  --  --      BLOOD PERIPHERAL  06-02-18 --  --  --      BLOOD VENOUS  06-02-18 --  --  BLOOD CULTURE PCR  Escherichia coli      URINE MIDSTREAM  06-02-18 --  --  Escherichia coli      RADIOLOGY:
Patient is a 73y old  Male who presents with a chief complaint of UTI (03 Jun 2018 18:11)      SUBJECTIVE / OVERNIGHT EVENTS: feels much improved, no fevers thus far this AM, denies dysuria or flank pain - c/o constipation, last BM 3-4 days ago, no N/V/D     ROS:  No HA/DZ  No Vision changes   No CP, SOB  No N/V/D  No Edema  No Rash  NO weakness, numbness    MEDICATIONS  (STANDING):  amLODIPine   Tablet 10 milliGRAM(s) Oral daily  aspirin  chewable 81 milliGRAM(s) Oral daily  cefTRIAXone   IVPB 2 Gram(s) IV Intermittent every 24 hours  docusate sodium 300 milliGRAM(s) Oral at bedtime  flecainide 150 milliGRAM(s) Oral every 12 hours  gabapentin 300 milliGRAM(s) Oral at bedtime  heparin  Injectable 5000 Unit(s) SubCutaneous every 8 hours  nebivolol 20 milliGRAM(s) Oral daily  oxybutynin 5 milliGRAM(s) Oral two times a day  pantoprazole    Tablet 40 milliGRAM(s) Oral before breakfast  simvastatin 10 milliGRAM(s) Oral at bedtime  sodium chloride 0.9%. 1000 milliLiter(s) (75 mL/Hr) IV Continuous <Continuous>    MEDICATIONS  (PRN):  acetaminophen   Tablet 650 milliGRAM(s) Oral every 6 hours PRN For Temp greater than 38 C (100.4 F)      T(C): 36.4 (06-04-18 @ 05:24)  HR: 54 (06-04-18 @ 05:24)  BP: 137/85 (06-04-18 @ 05:24)  RR: 18 (06-04-18 @ 05:24)  SpO2: 99% (06-04-18 @ 05:24)  CAPILLARY BLOOD GLUCOSE        I&O's Summary      PHYSICAL EXAM:  GENERAL: NAD, well-developed, AOx3  HEAD:  Atraumatic, Normocephalic  EYES: EOMI, PERRL, conjunctiva and sclera clear  NECK: Supple, No JVD  CHEST/LUNG: Clear to auscultation bilaterally  HEART: Regular rate and rhythm; No murmurs, rubs, or gallops, No Edema  ABDOMEN: Soft, Nontender, Nondistended; Bowel sounds present  EXTREMITIES:  2+ Peripheral Pulses, No clubbing, cyanosis  PSYCH: No SI/HI  NEUROLOGY: non-focal  SKIN: No rashes or lesions    LABS:                        11.3   6.29  )-----------( 159      ( 04 Jun 2018 05:35 )             34.3     06-04    137  |  99  |  23  ----------------------------<  99  4.2   |  22  |  1.66<H>    Ca    8.6      04 Jun 2018 05:35  Phos  3.4     06-02  Mg     1.9     06-02    TPro  6.7  /  Alb  3.4  /  TBili  0.6  /  DBili  x   /  AST  12  /  ALT  8   /  AlkPhos  47  06-03                  RADIOLOGY & ADDITIONAL TESTS:    Imaging Personally Reviewed:    Consultant(s) Notes Reviewed:      Care Discussed with Consultants/Other Providers: ID
Patient is a 73y old  Male who presents with a chief complaint of UTI (03 Jun 2018 18:11)      SUBJECTIVE / OVERNIGHT EVENTS: feels well, has no complaints     ROS:  No HA/DZ  No Vision changes   No CP, SOB  No N/V/D  No Edema  No Rash  NO weakness, numbness    MEDICATIONS  (STANDING):  amLODIPine   Tablet 10 milliGRAM(s) Oral daily  aspirin  chewable 81 milliGRAM(s) Oral daily  cefTRIAXone   IVPB 2 Gram(s) IV Intermittent every 24 hours  docusate sodium 300 milliGRAM(s) Oral at bedtime  flecainide 150 milliGRAM(s) Oral every 12 hours  gabapentin 300 milliGRAM(s) Oral at bedtime  heparin  Injectable 5000 Unit(s) SubCutaneous every 8 hours  lactulose Syrup 10 Gram(s) Oral every 8 hours  nebivolol 20 milliGRAM(s) Oral daily  oxybutynin 5 milliGRAM(s) Oral two times a day  pantoprazole    Tablet 40 milliGRAM(s) Oral before breakfast  polyethylene glycol 3350 17 Gram(s) Oral daily  senna 2 Tablet(s) Oral at bedtime  simvastatin 10 milliGRAM(s) Oral at bedtime  sodium chloride 0.9%. 1000 milliLiter(s) (75 mL/Hr) IV Continuous <Continuous>    MEDICATIONS  (PRN):  acetaminophen   Tablet 650 milliGRAM(s) Oral every 6 hours PRN For Temp greater than 38 C (100.4 F)      T(C): 36.8 (06-07-18 @ 05:13)  HR: 53 (06-07-18 @ 05:13)  BP: 122/64 (06-07-18 @ 05:13)  RR: 18 (06-07-18 @ 05:13)  SpO2: 99% (06-07-18 @ 05:13)  CAPILLARY BLOOD GLUCOSE        I&O's Summary      PHYSICAL EXAM:  GENERAL: NAD, well-developed, AOx3  HEAD:  Atraumatic, Normocephalic  EYES: EOMI, PERRL, conjunctiva and sclera clear  NECK: Supple, No JVD  CHEST/LUNG: Clear to auscultation bilaterally  HEART: Regular rate and rhythm; No murmurs, rubs, or gallops, No Edema  ABDOMEN: Soft, Nontender, Nondistended; Bowel sounds present  EXTREMITIES:  2+ Peripheral Pulses, No clubbing, cyanosis  PSYCH: No SI/HI  NEUROLOGY: non-focal  SKIN: No rashes or lesions    LABS:                        11.4   5.43  )-----------( 177      ( 07 Jun 2018 06:15 )             34.5     06-07    141  |  104  |  14  ----------------------------<  88  5.1   |  24  |  1.47<H>    Ca    9.4      07 Jun 2018 06:15                    RADIOLOGY & ADDITIONAL TESTS:    Imaging Personally Reviewed:    Consultant(s) Notes Reviewed:      Care Discussed with Consultants/Other Providers: TERRANCE Figueroa
Patient is a 73y old  Male who presents with a chief complaint of UTI (03 Jun 2018 18:11)      SUBJECTIVE / OVERNIGHT EVENTS: patient seen and examined by bedside at  10: 40 Am, feeling better, dysuria improved , afebrile       MEDICATIONS  (STANDING):  amLODIPine   Tablet 10 milliGRAM(s) Oral daily  aspirin  chewable 81 milliGRAM(s) Oral daily  cefTRIAXone   IVPB 2 Gram(s) IV Intermittent every 24 hours  docusate sodium 300 milliGRAM(s) Oral at bedtime  flecainide 150 milliGRAM(s) Oral every 12 hours  gabapentin 300 milliGRAM(s) Oral at bedtime  heparin  Injectable 5000 Unit(s) SubCutaneous every 8 hours  lactulose Syrup 10 Gram(s) Oral every 8 hours  nebivolol 20 milliGRAM(s) Oral daily  oxybutynin 5 milliGRAM(s) Oral two times a day  pantoprazole    Tablet 40 milliGRAM(s) Oral before breakfast  polyethylene glycol 3350 17 Gram(s) Oral daily  senna 2 Tablet(s) Oral at bedtime  simvastatin 10 milliGRAM(s) Oral at bedtime  sodium chloride 0.9%. 1000 milliLiter(s) (75 mL/Hr) IV Continuous <Continuous>    MEDICATIONS  (PRN):  acetaminophen   Tablet 650 milliGRAM(s) Oral every 6 hours PRN For Temp greater than 38 C (100.4 F)      Vital Signs Last 24 Hrs  T(C): 36.3 (06 Jun 2018 05:23), Max: 36.3 (05 Jun 2018 21:33)  T(F): 97.4 (06 Jun 2018 05:23), Max: 97.4 (05 Jun 2018 21:33)  HR: 52 (06 Jun 2018 05:23) (52 - 60)  BP: 124/70 (06 Jun 2018 05:23) (124/70 - 139/68)  BP(mean): --  RR: 17 (06 Jun 2018 05:23) (17 - 17)  SpO2: 98% (06 Jun 2018 05:23) (94% - 98%)  CAPILLARY BLOOD GLUCOSE        I&O's Summary      PHYSICAL EXAM:  GENERAL: NAD, obese  HEAD:  Atraumatic, Normocephalic  EYES: EOMI, PERRLA, conjunctiva and sclera clear  NECK: Supple,  CHEST/LUNG: Clear to auscultation bilaterally; No wheeze  HEART: Regular rate and rhythm;   ABDOMEN: Soft, Nontender, Nondistended; Bowel sounds present, no CVA tenderness   EXTREMITIES:  2+ Peripheral Pulses, No clubbing, cyanosis, or edema  PSYCH: AAOx3  NEUROLOGY: non-focal  SKIN: No rashes or lesions    LABS:                        11.2   5.05  )-----------( 174      ( 06 Jun 2018 05:37 )             34.6     06-06    137  |  101  |  17  ----------------------------<  95  4.5   |  24  |  1.46<H>    Ca    8.7      06 Jun 2018 05:37                RADIOLOGY & ADDITIONAL TESTS:    Imaging Personally Reviewed:    Consultant(s) Notes Reviewed:  ID     Care Discussed with Consultants/Other Providers:
This encounter took place on 6/3/18.    Patient is a 73y old  Male who presents with a chief complaint of UTI (2018 18:11)        SUBJECTIVE / OVERNIGHT EVENTS: Pt reports resolution of chills, generally feeling more energetic.      MEDICATIONS  (STANDING):  amLODIPine   Tablet 10 milliGRAM(s) Oral daily  aspirin  chewable 81 milliGRAM(s) Oral daily  cefTRIAXone   IVPB 1 Gram(s) IV Intermittent every 24 hours  docusate sodium 300 milliGRAM(s) Oral at bedtime  flecainide 150 milliGRAM(s) Oral every 12 hours  gabapentin 300 milliGRAM(s) Oral at bedtime  heparin  Injectable 5000 Unit(s) SubCutaneous every 8 hours  nebivolol 20 milliGRAM(s) Oral daily  oxybutynin 5 milliGRAM(s) Oral two times a day  pantoprazole    Tablet 40 milliGRAM(s) Oral before breakfast  simvastatin 10 milliGRAM(s) Oral at bedtime  sodium chloride 0.9%. 1000 milliLiter(s) (75 mL/Hr) IV Continuous <Continuous>    MEDICATIONS  (PRN):  acetaminophen   Tablet 650 milliGRAM(s) Oral every 6 hours PRN For Temp greater than 38 C (100.4 F)      Vital Signs Last 24 Hrs  T(C): 36.4 (2018 05:24), Max: 38.2 (2018 20:32)  T(F): 97.6 (2018 05:24), Max: 100.7 (2018 20:32)  HR: 54 (2018 05:24) (50 - 60)  BP: 137/85 (2018 05:24) (109/62 - 137/85)  BP(mean): --  RR: 18 (2018 05:24) (17 - 18)  SpO2: 99% (2018 05:24) (96% - 99%)  CAPILLARY BLOOD GLUCOSE        I&O's Summary        PHYSICAL EXAM  GENERAL: NAD, well-developed  HEAD:  Atraumatic, Normocephalic  EYES: EOMI, PERRLA, conjunctiva and sclera clear  NECK: Supple, No JVD  CHEST/LUNG: Clear to auscultation bilaterally; No wheeze  HEART: Regular rate and rhythm; No murmurs, rubs, or gallops  ABDOMEN: Soft, Nontender, Nondistended; Bowel sounds present  EXTREMITIES:  2+ Peripheral Pulses, No clubbing, cyanosis, or edema  PSYCH: AAOx3  SKIN: No rashes or lesions    LABS:                        11.3   6.29  )-----------( 159      ( 2018 05:35 )             34.3     06-04    137  |  99  |  23  ----------------------------<  99  4.2   |  22  |  1.66<H>    Ca    8.6      2018 05:35  Phos  3.4     06-02  Mg     1.9     06-02    TPro  6.7  /  Alb  3.4  /  TBili  0.6  /  DBili  x   /  AST  12  /  ALT  8   /  AlkPhos  47  06-03          Urinalysis Basic - ( 2018 11:35 )    Color: YELLOW / Appearance: TURBID / S.022 / pH: 6.5  Gluc: NEGATIVE / Ketone: NEGATIVE  / Bili: NEGATIVE / Urobili: NORMAL mg/dL   Blood: MODERATE / Protein: 300 mg/dL / Nitrite: POSITIVE   Leuk Esterase: LARGE / RBC: 25-50 / WBC >50   Sq Epi: OCC / Non Sq Epi: x / Bacteria: x        Culture - Blood (collected 2018 12:47)  Source: BLOOD PERIPHERAL    Culture - Blood (collected 2018 12:20)  Source: BLOOD VENOUS  Preliminary Report (2018 07:51):    ***Blood Panel PCR results on this specimen are available    approximately 3 hours after the Gram stain result***    Gram stain, PCR, and/or culture results may not always    correspond due to difference in methodologies    ------------------------------------------------------------    This PCR assay was performed using [employer].  The    following targets are tested for:  Enterococcus, vancomycin    resistant enterococci, Listeria monocytogenes,  coagulase    negative staphylococci, S. aureus, methicillin resistant S.    aureus, Streptococcus agalactiae (Group B), S. pneumoniae,    S. pyogenes (Group A), Acinetobacter baumannii, Enterobacter    cloacae, E. coli, Klebsiella oxytoca, K. pneumoniae, Proteus    sp., Serratia marcescens, Haemophilus influenzae, Neisseria    meningitidis, Pseudomonas aeruginosa, Candida albicans, C.    glabrata, C. krusei, C. parapsilosis, C. tropicalis and the    KPC resistance gene.    **NOTE: Due to technical problems, Proteus sp. will NOT be    reported as part of the BCID paneluntil further notice.  Organism: BLOOD CULTURE PCR (2018 07:51)  Organism: BLOOD CULTURE PCR (2018 07:51)    Culture - Urine (collected 2018 11:43)  Source: URINE MIDSTREAM  Preliminary Report (2018 09:53):    EC^Escherichia coli    COLONY COUNT: > = 100,000 CFU/ML
Patient is a 73y old  Male who presents with a chief complaint of UTI (03 Jun 2018 18:11)      SUBJECTIVE / OVERNIGHT EVENTS: remains afebrile, still with no BM. denies any urinary symptoms, flank pain     ROS:  No HA/DZ  No Vision changes   No CP, SOB  No N/V/D  No Edema  No Rash  NO weakness, numbness    MEDICATIONS  (STANDING):  amLODIPine   Tablet 10 milliGRAM(s) Oral daily  aspirin  chewable 81 milliGRAM(s) Oral daily  cefTRIAXone   IVPB 2 Gram(s) IV Intermittent every 24 hours  docusate sodium 300 milliGRAM(s) Oral at bedtime  flecainide 150 milliGRAM(s) Oral every 12 hours  gabapentin 300 milliGRAM(s) Oral at bedtime  heparin  Injectable 5000 Unit(s) SubCutaneous every 8 hours  nebivolol 20 milliGRAM(s) Oral daily  oxybutynin 5 milliGRAM(s) Oral two times a day  pantoprazole    Tablet 40 milliGRAM(s) Oral before breakfast  polyethylene glycol 3350 17 Gram(s) Oral daily  senna 2 Tablet(s) Oral at bedtime  simvastatin 10 milliGRAM(s) Oral at bedtime  sodium chloride 0.9%. 1000 milliLiter(s) (75 mL/Hr) IV Continuous <Continuous>    MEDICATIONS  (PRN):  acetaminophen   Tablet 650 milliGRAM(s) Oral every 6 hours PRN For Temp greater than 38 C (100.4 F)      T(C): 36.6 (06-05-18 @ 05:07)  HR: 56 (06-05-18 @ 08:03)  BP: 132/79 (06-05-18 @ 05:07)  RR: 18 (06-05-18 @ 05:07)  SpO2: 99% (06-05-18 @ 08:03)  CAPILLARY BLOOD GLUCOSE        I&O's Summary      PHYSICAL EXAM:  GENERAL: NAD, well-developed, AOx3  HEAD:  Atraumatic, Normocephalic  EYES: EOMI, PERRL, conjunctiva and sclera clear  NECK: Supple, No JVD  CHEST/LUNG: Clear to auscultation bilaterally  HEART: Regular rate and rhythm; No murmurs, rubs, or gallops, No Edema  ABDOMEN: Soft, Nontender, Nondistended; Bowel sounds present  EXTREMITIES:  2+ Peripheral Pulses, No clubbing, cyanosis  PSYCH: No SI/HI  NEUROLOGY: non-focal  SKIN: No rashes or lesions    LABS:                        11.3   6.29  )-----------( 159      ( 04 Jun 2018 05:35 )             34.3     06-05    139  |  102  |  21  ----------------------------<  96  4.7   |  24  |  1.60<H>    Ca    8.6      05 Jun 2018 05:35                    RADIOLOGY & ADDITIONAL TESTS:    Imaging Personally Reviewed: CXR- [facility]    Consultant(s) Notes Reviewed:      Care Discussed with Consultants/Other Providers: LAITH Figueroa

## 2018-06-07 NOTE — PROGRESS NOTE ADULT - PROBLEM SELECTOR PLAN 4
Not on anticoagulation, despite CHADS score of 2 (age > 75 + HTN). Unclear if there is history of major bleeding. For now c/w aspirin 81mg and address possible addition of apixaban for stroke prevention as out-pt. Continue with flecainide.
Not on anticoagulation, despite CHADS score of 2 (age > 75 + HTN). Unclear if there is history of major bleeding. For now c/w aspirin 81mg and address possible addition of apixaban for  stroke prevention. Continue with flecainide.
Not on anticoagulation, despite CHADS score of 2 (age > 75 + HTN). Unclear if there is history of major bleeding. For now c/w aspirin 81mg and address possible addition of apixaban for stroke prevention as out-pt. Continue with flecainide.

## 2018-06-07 NOTE — PROGRESS NOTE ADULT - PROBLEM SELECTOR PLAN 5
heparin 5000u SQ TID for VTE ppx.  constipation - add lactulose heparin 5000u SQ TID for VTE ppx.  DC home today - Time spent on DC planning 35 min

## 2018-06-07 NOTE — PROVIDER CONTACT NOTE (MEDICATION) - RECOMMENDATIONS
as per ADS, hold PO Flecainide and PO Bystolic for parameters HR <55, okay to give PO Norvasc at this time. Continue to monitor.

## 2018-06-07 NOTE — PROGRESS NOTE ADULT - PROBLEM SELECTOR PLAN 3
- c/w amlodipine, Bystolic  - holding home lisinopril  -BP stable - c/w amlodipine, Bystolic  - holding home lisinopril due to recent ANITA, consider resuming as outpt if repeat renal fx stable   -BP stable

## 2018-06-07 NOTE — PROGRESS NOTE ADULT - PROBLEM SELECTOR PLAN 1
- Due to E.coli, now with bacteremia, repeat Bcx neg and pan-sensitive   - on Rocephin to 2gm IV daily  - DW ID, - Due to E.coli, now with bacteremia, repeat Bcx neg and pan-sensitive   - on Rocephin to 2gm IV daily  - DW ID, switch to PO cipro through 6/14

## 2018-06-07 NOTE — PROGRESS NOTE ADULT - ASSESSMENT
73 M PMH of prostate cancer s/p brachytherapy (2007) and TURP (2015) in remission, HTN, HLD, AFib (not on anticoagulation, loop recorder implanted 3 months ago) presenting with pyelonephritis leading to bacteremia and ANITA.
73M w/ PMH of prostate cancer s/p brachytherapy (2007) and TURP (2015), HTN, HLD, AFib (not on anticoagulation, loop recorder implanted 3 months ago) presenting with pyelonephritis and ANITA.
E coli urosepsis in 72 yo man with prostate cancer, s/p TURP x 3, radiation seeds, presenting with fever, rigors, urinary frequency, left flank pain.    Blood culture and urine culture with e coli.  afebrile and clinically improved on ceftriaxone.    Suggest:             continue ceftriaxone 2 gm iv q 24 hours                             if remains afebrile will transition to cipro po in next few days    Patient follows with Dr. Art in urology- please inform of hospitalization
E coli urosepsis in 72 yo man with prostate cancer, s/p TURP x 3, radiation seeds, presenting with fever, rigors, urinary frequency, left flank pain.    Blood culture and urine culture with e coli.  afebrile and clinically improved on ceftriaxone.    Suggest:             continue ceftriaxone 2 gm iv q 24 hours through 6/7 then                             transition to cipro po through 6/14
73 M PMH of prostate cancer s/p brachytherapy (2007) and TURP (2015) in remission, HTN, HLD, AFib (not on anticoagulation, loop recorder implanted 3 months ago) presenting with pyelonephritis leading to bacteremia and ANITA.

## 2018-06-07 NOTE — PROGRESS NOTE ADULT - PROBLEM SELECTOR PROBLEM 2
ANITA (acute kidney injury)

## 2018-06-07 NOTE — PROGRESS NOTE ADULT - PROBLEM SELECTOR PLAN 2
Increase in creatinine to 1.9 (baseline ~ 1.4) likely prerenal (  cr trending down , now 1.46   - resolving, resume IVF for now until Cr. back to baseline   - holding home lisinopril and eplerenone resolved, DC IVF

## 2018-06-09 LAB
BACTERIA BLD CULT: SIGNIFICANT CHANGE UP
BACTERIA BLD CULT: SIGNIFICANT CHANGE UP

## 2018-07-13 ENCOUNTER — APPOINTMENT (OUTPATIENT)
Dept: UROLOGY | Facility: CLINIC | Age: 73
End: 2018-07-13
Payer: MEDICARE

## 2018-07-13 DIAGNOSIS — N39.0 URINARY TRACT INFECTION, SITE NOT SPECIFIED: ICD-10-CM

## 2018-07-13 PROCEDURE — 51798 US URINE CAPACITY MEASURE: CPT

## 2018-07-13 PROCEDURE — 99213 OFFICE O/P EST LOW 20 MIN: CPT | Mod: 25

## 2018-07-16 ENCOUNTER — APPOINTMENT (OUTPATIENT)
Age: 73
End: 2018-07-16

## 2018-07-16 PROBLEM — I48.91 UNSPECIFIED ATRIAL FIBRILLATION: Chronic | Status: ACTIVE | Noted: 2017-07-24

## 2018-07-16 PROBLEM — Z85.46 PERSONAL HISTORY OF MALIGNANT NEOPLASM OF PROSTATE: Chronic | Status: ACTIVE | Noted: 2017-07-24

## 2018-07-16 LAB — BACTERIA UR CULT: NORMAL

## 2018-08-02 ENCOUNTER — APPOINTMENT (OUTPATIENT)
Dept: NEPHROLOGY | Facility: CLINIC | Age: 73
End: 2018-08-02
Payer: MEDICARE

## 2018-08-02 VITALS
OXYGEN SATURATION: 96 % | HEART RATE: 55 BPM | WEIGHT: 293 LBS | BODY MASS INDEX: 39.68 KG/M2 | DIASTOLIC BLOOD PRESSURE: 80 MMHG | HEIGHT: 72 IN | SYSTOLIC BLOOD PRESSURE: 138 MMHG

## 2018-08-02 PROBLEM — I10 ESSENTIAL (PRIMARY) HYPERTENSION: Chronic | Status: ACTIVE | Noted: 2017-07-24

## 2018-08-02 PROBLEM — K21.9 GASTRO-ESOPHAGEAL REFLUX DISEASE WITHOUT ESOPHAGITIS: Chronic | Status: ACTIVE | Noted: 2017-07-24

## 2018-08-02 PROBLEM — E78.5 HYPERLIPIDEMIA, UNSPECIFIED: Chronic | Status: ACTIVE | Noted: 2017-07-24

## 2018-08-02 PROBLEM — N40.0 BENIGN PROSTATIC HYPERPLASIA WITHOUT LOWER URINARY TRACT SYMPTOMS: Chronic | Status: ACTIVE | Noted: 2017-07-24

## 2018-08-02 PROBLEM — N42.9 DISORDER OF PROSTATE, UNSPECIFIED: Chronic | Status: ACTIVE | Noted: 2017-07-24

## 2018-08-02 PROBLEM — M50.90 CERVICAL DISC DISORDER, UNSPECIFIED, UNSPECIFIED CERVICAL REGION: Chronic | Status: ACTIVE | Noted: 2017-07-24

## 2018-08-02 PROBLEM — G47.30 SLEEP APNEA, UNSPECIFIED: Chronic | Status: ACTIVE | Noted: 2017-07-24

## 2018-08-02 PROCEDURE — 99205 OFFICE O/P NEW HI 60 MIN: CPT

## 2018-08-02 RX ORDER — OXYBUTYNIN CHLORIDE 10 MG/1
10 TABLET, EXTENDED RELEASE ORAL
Qty: 30 | Refills: 2 | Status: DISCONTINUED | COMMUNITY
Start: 2018-01-12 | End: 2018-08-02

## 2018-08-02 RX ORDER — CEPHALEXIN 500 MG/1
500 CAPSULE ORAL
Qty: 9 | Refills: 0 | Status: DISCONTINUED | COMMUNITY
Start: 2017-08-02 | End: 2018-08-02

## 2018-08-02 RX ORDER — ALFUZOSIN HYDROCHLORIDE 10 MG/1
10 TABLET, EXTENDED RELEASE ORAL
Qty: 90 | Refills: 3 | Status: DISCONTINUED | COMMUNITY
Start: 2017-03-01 | End: 2018-08-02

## 2018-08-02 RX ORDER — OXYBUTYNIN CHLORIDE 5 MG/1
5 TABLET, EXTENDED RELEASE ORAL
Qty: 30 | Refills: 1 | Status: DISCONTINUED | COMMUNITY
Start: 2017-08-08 | End: 2018-08-02

## 2018-08-02 RX ORDER — OXYBUTYNIN CHLORIDE 15 MG/1
15 TABLET, EXTENDED RELEASE ORAL
Qty: 30 | Refills: 0 | Status: DISCONTINUED | COMMUNITY
Start: 2018-01-29 | End: 2018-08-02

## 2018-08-03 LAB
24R-OH-CALCIDIOL SERPL-MCNC: 32.4 PG/ML
25(OH)D3 SERPL-MCNC: 34.4 NG/ML
ALBUMIN SERPL ELPH-MCNC: 4.8 G/DL
ANION GAP SERPL CALC-SCNC: 16 MMOL/L
APPEARANCE: CLEAR
BACTERIA: NEGATIVE
BASOPHILS # BLD AUTO: 0.02 K/UL
BASOPHILS NFR BLD AUTO: 0.3 %
BILIRUBIN URINE: NEGATIVE
BLOOD URINE: NEGATIVE
BUN SERPL-MCNC: 23 MG/DL
CALCIUM SERPL-MCNC: 9.8 MG/DL
CALCIUM SERPL-MCNC: 9.8 MG/DL
CHLORIDE SERPL-SCNC: 106 MMOL/L
CO2 SERPL-SCNC: 21 MMOL/L
COLOR: YELLOW
CREAT SERPL-MCNC: 1.56 MG/DL
CREAT SPEC-SCNC: 265 MG/DL
CREAT/PROT UR: 0.1 RATIO
EOSINOPHIL # BLD AUTO: 0.15 K/UL
EOSINOPHIL NFR BLD AUTO: 2.4 %
GLUCOSE QUALITATIVE U: NEGATIVE MG/DL
GLUCOSE SERPL-MCNC: 104 MG/DL
HBA1C MFR BLD HPLC: 5.8 %
HCT VFR BLD CALC: 40.7 %
HGB BLD-MCNC: 13.3 G/DL
HYALINE CASTS: 2 /LPF
IMM GRANULOCYTES NFR BLD AUTO: 0.2 %
KETONES URINE: NEGATIVE
LEUKOCYTE ESTERASE URINE: NEGATIVE
LYMPHOCYTES # BLD AUTO: 1.99 K/UL
LYMPHOCYTES NFR BLD AUTO: 32.4 %
MAN DIFF?: NORMAL
MCHC RBC-ENTMCNC: 29.4 PG
MCHC RBC-ENTMCNC: 32.7 GM/DL
MCV RBC AUTO: 89.8 FL
MICROSCOPIC-UA: NORMAL
MONOCYTES # BLD AUTO: 0.61 K/UL
MONOCYTES NFR BLD AUTO: 9.9 %
NEUTROPHILS # BLD AUTO: 3.37 K/UL
NEUTROPHILS NFR BLD AUTO: 54.8 %
NITRITE URINE: NEGATIVE
PARATHYROID HORMONE INTACT: 44 PG/ML
PH URINE: 5.5
PHOSPHATE SERPL-MCNC: 4.1 MG/DL
PLATELET # BLD AUTO: 224 K/UL
POTASSIUM SERPL-SCNC: 5.3 MMOL/L
PROT UR-MCNC: 26 MG/DL
PROTEIN URINE: ABNORMAL MG/DL
RBC # BLD: 4.53 M/UL
RBC # FLD: 15.1 %
RED BLOOD CELLS URINE: 5 /HPF
SODIUM SERPL-SCNC: 143 MMOL/L
SPECIFIC GRAVITY URINE: 1.03
SQUAMOUS EPITHELIAL CELLS: 0 /HPF
UROBILINOGEN URINE: NEGATIVE MG/DL
WBC # FLD AUTO: 6.15 K/UL
WHITE BLOOD CELLS URINE: 3 /HPF

## 2018-11-09 ENCOUNTER — APPOINTMENT (OUTPATIENT)
Dept: UROLOGY | Facility: CLINIC | Age: 73
End: 2018-11-09
Payer: MEDICARE

## 2018-11-09 VITALS
TEMPERATURE: 97.7 F | HEART RATE: 50 BPM | RESPIRATION RATE: 18 BRPM | SYSTOLIC BLOOD PRESSURE: 144 MMHG | DIASTOLIC BLOOD PRESSURE: 94 MMHG

## 2018-11-09 PROCEDURE — 51798 US URINE CAPACITY MEASURE: CPT

## 2018-11-09 PROCEDURE — 99213 OFFICE O/P EST LOW 20 MIN: CPT | Mod: 25

## 2019-01-01 ENCOUNTER — TRANSCRIPTION ENCOUNTER (OUTPATIENT)
Age: 74
End: 2019-01-01

## 2019-02-01 ENCOUNTER — APPOINTMENT (OUTPATIENT)
Dept: NEPHROLOGY | Facility: CLINIC | Age: 74
End: 2019-02-01
Payer: MEDICARE

## 2019-02-01 VITALS
DIASTOLIC BLOOD PRESSURE: 72 MMHG | OXYGEN SATURATION: 97 % | WEIGHT: 299.82 LBS | HEART RATE: 53 BPM | SYSTOLIC BLOOD PRESSURE: 121 MMHG | BODY MASS INDEX: 40.61 KG/M2 | HEIGHT: 72 IN

## 2019-02-01 PROCEDURE — 99214 OFFICE O/P EST MOD 30 MIN: CPT

## 2019-02-01 NOTE — ASSESSMENT
[FreeTextEntry1] : The patient is a 73 years old man with a history of CKD III here for evaluation\par \par Chronic Kidney Disease Stage III -- Based upon the history he has had long standing HTN and has followed with a nephrologist in [place].  He has no complaints at this time and is well compensated.  His creatinine when checked last was stable.  He will have his most recent blood work from his PMD sent to me.  He is going to follow up with urology about his microscopic hematuria.  Follow up every six months while creatinine is stable.\par \par Hypertension -- The patient has hypertension in the setting of CKD.  He is well controlled and he will follow a strictly low salt diet.

## 2019-02-01 NOTE — HISTORY OF PRESENT ILLNESS
[FreeTextEntry1] : Today I had the pleasure of meeting Carlitos Lanza.  He is a 73 years old man who is here today with his wife.  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  Most recently he has been living in [facility].  He is now moved back home for [facility] to [place] to help take care of his 100 years old mother.  He has had hypertension for the last 30 years and he has been on some form of medication since that time.  More recently, in 2007, he had prostate cancer s/p seed implants, urethral stricture, and multiple TURP.  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  He is here today for evaluation of an elevated creatinine.  The patient states that he first became aware of kidney disease about four or so years ago and his baseline creatinine seem to be about a 1.4.  Recently, he was admitted to [facility] with urosepsis at which point his creatinine was 1.9 but then came down to his baseline.  On my evaluation today he feels well denies complaints no CP no SOB no NVD.  He has no hematuria or dysuria.  Of note he has also recently had an angiogram and he has afib and now has a loop recorder.\par \par 2/1/19 -- Today I saw Ruiz Lanza and he is feeling pretty well overall.  He has not had any major intercurrent health issues.  He has had blood work and a full physical recently at his PMD.  He has been struggling somewhat with weight / diet but he has been trying to get to the gym.  He has lost some weight.

## 2019-02-01 NOTE — PHYSICAL EXAM
[General Appearance - Alert] : alert [General Appearance - In No Acute Distress] : in no acute distress [General Appearance - Well Nourished] : well nourished [General Appearance - Well Developed] : well developed [Sclera] : the sclera and conjunctiva were normal [Hearing Threshold Finger Rub Not Towner] : hearing was normal [Examination Of The Oral Cavity] : the lips and gums were normal [Oropharynx] : the oropharynx was normal [Neck Appearance] : the appearance of the neck was normal [Neck Cervical Mass (___cm)] : no neck mass was observed [Jugular Venous Distention Increased] : there was no jugular-venous distention [Respiration, Rhythm And Depth] : normal respiratory rhythm and effort [Exaggerated Use Of Accessory Muscles For Inspiration] : no accessory muscle use [Auscultation Breath Sounds / Voice Sounds] : lungs were clear to auscultation bilaterally [Heart Sounds] : normal S1 and S2 [Heart Sounds Gallop] : no gallops [Murmurs] : no murmurs [Heart Sounds Pericardial Friction Rub] : no pericardial rub [Abdomen Soft] : soft [Abdomen Tenderness] : non-tender [Abdomen Mass (___ Cm)] : no abdominal mass palpated [Cervical Lymph Nodes Enlarged Posterior Bilaterally] : posterior cervical [Cervical Lymph Nodes Enlarged Anterior Bilaterally] : anterior cervical [Supraclavicular Lymph Nodes Enlarged Bilaterally] : supraclavicular [No CVA Tenderness] : no ~M costovertebral angle tenderness [No Spinal Tenderness] : no spinal tenderness [Abnormal Walk] : normal gait [] : no rash [Oriented To Time, Place, And Person] : oriented to person, place, and time [Affect] : the affect was normal [Mood] : the mood was normal [FreeTextEntry1] : trace edema

## 2019-02-01 NOTE — REVIEW OF SYSTEMS
[Fever] : no fever [Chills] : no chills [Feeling Poorly] : not feeling poorly [Feeling Tired] : not feeling tired [Eye Pain] : no eye pain [Nosebleeds] : no nosebleeds [Shortness Of Breath] : no shortness of breath [Wheezing] : no wheezing [Cough] : no cough [Abdominal Pain] : no abdominal pain [Vomiting] : no vomiting [Hesitancy] : no urinary hesitancy [Nocturia] : no nocturia [Joint Swelling] : no joint swelling [Joint Stiffness] : no joint stiffness [Dizziness] : no dizziness [Fainting] : no fainting [Anxiety] : no anxiety [Depression] : no depression [Easy Bleeding] : no tendency for easy bleeding

## 2019-03-15 ENCOUNTER — RX CHANGE (OUTPATIENT)
Age: 74
End: 2019-03-15

## 2019-05-10 ENCOUNTER — APPOINTMENT (OUTPATIENT)
Dept: UROLOGY | Facility: CLINIC | Age: 74
End: 2019-05-10

## 2019-07-01 ENCOUNTER — MEDICATION RENEWAL (OUTPATIENT)
Age: 74
End: 2019-07-01

## 2019-07-25 ENCOUNTER — APPOINTMENT (OUTPATIENT)
Dept: NEPHROLOGY | Facility: CLINIC | Age: 74
End: 2019-07-25

## 2019-08-14 ENCOUNTER — TRANSCRIPTION ENCOUNTER (OUTPATIENT)
Age: 74
End: 2019-08-14

## 2019-09-26 NOTE — H&P PST ADULT - ANTERIOR CERVICAL R
Spoke with patient, advised Botox is not in the office. Advised to the patient once Botox is delivered will contact the patient for a sooner appointment. normal

## 2019-10-17 ENCOUNTER — APPOINTMENT (OUTPATIENT)
Age: 74
End: 2019-10-17

## 2019-10-17 ENCOUNTER — CLINICAL ADVICE (OUTPATIENT)
Age: 74
End: 2019-10-17

## 2019-10-17 LAB
APPEARANCE: CLEAR
BACTERIA UR CULT: NORMAL
BACTERIA: NEGATIVE
BILIRUBIN URINE: NEGATIVE
BLOOD URINE: ABNORMAL
COLOR: NORMAL
GLUCOSE QUALITATIVE U: NEGATIVE
HYALINE CASTS: 0 /LPF
KETONES URINE: NEGATIVE
LEUKOCYTE ESTERASE URINE: ABNORMAL
MICROSCOPIC-UA: NORMAL
NITRITE URINE: NEGATIVE
PH URINE: 6
PROTEIN URINE: NEGATIVE
RED BLOOD CELLS URINE: 52 /HPF
SPECIFIC GRAVITY URINE: 1.02
SQUAMOUS EPITHELIAL CELLS: 1 /HPF
UROBILINOGEN URINE: NORMAL
WHITE BLOOD CELLS URINE: 17 /HPF

## 2019-11-08 ENCOUNTER — OTHER (OUTPATIENT)
Age: 74
End: 2019-11-08

## 2019-11-08 ENCOUNTER — APPOINTMENT (OUTPATIENT)
Dept: UROLOGY | Facility: CLINIC | Age: 74
End: 2019-11-08
Payer: MEDICARE

## 2019-11-08 DIAGNOSIS — N13.8 BENIGN PROSTATIC HYPERPLASIA WITH LOWER URINARY TRACT SYMPMS: ICD-10-CM

## 2019-11-08 DIAGNOSIS — N40.1 BENIGN PROSTATIC HYPERPLASIA WITH LOWER URINARY TRACT SYMPMS: ICD-10-CM

## 2019-11-08 PROCEDURE — 99214 OFFICE O/P EST MOD 30 MIN: CPT

## 2019-11-08 NOTE — HISTORY OF PRESENT ILLNESS
[FreeTextEntry1] : Patient is a 73 yo M who presents for f/u of urethral stricture/prostate stenosis and urinary urgency.\par \par He comes in for intermittent gross hematuria for past 1-2 months.  Otherwise, LUTS are stable - does have urinary frequency q2 hrs and good FOS and control.  He is not taking ACh or medications.  No dysuria.  No fever/chills.\par \par \par PRIOR HX:\par He has h/o prostate cancer s/p brachy in 2007.  He developed urinary symptoms after radiation.  He had a TURP by Dr Scott, then states that he had a cystoscopy showing urethral stricture and developed AUR thereafter.  He then had catheter for several wks, ultimately had another TURP in 2014.  Most recent TURP (#3) in 10/2015 by Dr Kate.  He was recommended to have hyperbaric oxygen after 2nd TURP, but did not and ultimately underwent third TURP.   These events occurred in [place].  He has had two episodes of AUR, last in 2012.  He reports his LUTS are mainly weak stream, urinary frequency, nocturia x2.  He has been on flomax in past. Cysto and RUG here showed a proximal stricture from BM urethra to mid prostate.  Most recent PSA 1/2018 0.01.\par \par S/p mini-TURP, urethral dilation, injection of kenalog. He is dry at night sleeping, but has nocturia x2-3.  In daytime his control has stabilized, using 1 pad daily.   Good FOS and standing with urination.  Previously spraying with urination and weak stream and had to sit to void.\par \par Tried oxybutynin and vesicare without success for for urgency, minimal UUI.  He does not want botox procedure.  Only has UUI if he waits a long time to void.

## 2019-11-08 NOTE — ASSESSMENT
[FreeTextEntry1] : Patient is a 75 yo M who presents with recurrent/recalcitrant prostatic urethral stenosis.  Obstructive voiding symptoms now improved after TURP, urethral dilation, injection of kenalog.\par \par However recurrent intermittent gross hematuria.\par Discussed with pt the implications of hematuria and need to further evaluate to rule out potentially dangerous or malignant underlying etiologies.  Discussed with pt need for upper tract imaging and cystoscopy.\par D/w pt that may be related to his prostate cancer radiation, radiation cystitis.\par MRU as mild CKD Cr 1.6 range\par F/u for cystoscopy.

## 2019-11-14 ENCOUNTER — APPOINTMENT (OUTPATIENT)
Age: 74
End: 2019-11-14

## 2019-11-14 LAB — URINE CYTOLOGY: NORMAL

## 2019-11-17 ENCOUNTER — FORM ENCOUNTER (OUTPATIENT)
Age: 74
End: 2019-11-17

## 2019-11-18 ENCOUNTER — APPOINTMENT (OUTPATIENT)
Dept: MRI IMAGING | Facility: CLINIC | Age: 74
End: 2019-11-18
Payer: MEDICARE

## 2019-11-18 ENCOUNTER — OUTPATIENT (OUTPATIENT)
Dept: OUTPATIENT SERVICES | Facility: HOSPITAL | Age: 74
LOS: 1 days | End: 2019-11-18

## 2019-11-18 DIAGNOSIS — Z90.79 ACQUIRED ABSENCE OF OTHER GENITAL ORGAN(S): Chronic | ICD-10-CM

## 2019-11-18 DIAGNOSIS — Z98.890 OTHER SPECIFIED POSTPROCEDURAL STATES: Chronic | ICD-10-CM

## 2019-11-18 DIAGNOSIS — R31.0 GROSS HEMATURIA: ICD-10-CM

## 2019-11-18 PROCEDURE — 74183 MRI ABD W/O CNTR FLWD CNTR: CPT | Mod: 26

## 2019-11-18 PROCEDURE — 72197 MRI PELVIS W/O & W/DYE: CPT | Mod: 26

## 2019-11-26 ENCOUNTER — APPOINTMENT (OUTPATIENT)
Dept: UROLOGY | Facility: CLINIC | Age: 74
End: 2019-11-26
Payer: MEDICARE

## 2019-11-26 ENCOUNTER — OUTPATIENT (OUTPATIENT)
Dept: OUTPATIENT SERVICES | Facility: HOSPITAL | Age: 74
LOS: 1 days | End: 2019-11-26
Payer: MEDICARE

## 2019-11-26 VITALS
HEART RATE: 58 BPM | TEMPERATURE: 98.7 F | DIASTOLIC BLOOD PRESSURE: 87 MMHG | SYSTOLIC BLOOD PRESSURE: 132 MMHG | RESPIRATION RATE: 17 BRPM

## 2019-11-26 DIAGNOSIS — Z98.890 OTHER SPECIFIED POSTPROCEDURAL STATES: Chronic | ICD-10-CM

## 2019-11-26 DIAGNOSIS — R35.0 FREQUENCY OF MICTURITION: ICD-10-CM

## 2019-11-26 DIAGNOSIS — Z90.79 ACQUIRED ABSENCE OF OTHER GENITAL ORGAN(S): Chronic | ICD-10-CM

## 2019-11-26 PROCEDURE — 52000 CYSTOURETHROSCOPY: CPT

## 2019-12-03 DIAGNOSIS — R31.0 GROSS HEMATURIA: ICD-10-CM

## 2020-03-10 ENCOUNTER — TRANSCRIPTION ENCOUNTER (OUTPATIENT)
Age: 75
End: 2020-03-10

## 2020-10-19 ENCOUNTER — APPOINTMENT (OUTPATIENT)
Dept: NEPHROLOGY | Facility: CLINIC | Age: 75
End: 2020-10-19
Payer: MEDICARE

## 2020-10-19 VITALS — DIASTOLIC BLOOD PRESSURE: 77 MMHG | HEART RATE: 76 BPM | SYSTOLIC BLOOD PRESSURE: 111 MMHG

## 2020-10-19 DIAGNOSIS — I48.91 UNSPECIFIED ATRIAL FIBRILLATION: ICD-10-CM

## 2020-10-19 PROCEDURE — 99214 OFFICE O/P EST MOD 30 MIN: CPT

## 2020-10-19 RX ORDER — SPIRONOLACTONE 50 MG/1
50 TABLET ORAL
Refills: 0 | Status: DISCONTINUED | COMMUNITY
Start: 2019-02-01 | End: 2020-10-19

## 2020-10-19 RX ORDER — OXYBUTYNIN CHLORIDE 10 MG/1
10 TABLET, EXTENDED RELEASE ORAL
Qty: 30 | Refills: 3 | Status: DISCONTINUED | COMMUNITY
Start: 2019-03-18 | End: 2020-10-19

## 2020-10-19 RX ORDER — RIVAROXABAN 20 MG/1
20 TABLET, FILM COATED ORAL
Qty: 30 | Refills: 0 | Status: ACTIVE | COMMUNITY
Start: 2020-09-22

## 2020-10-19 RX ORDER — SIMVASTATIN 10 MG/1
10 TABLET, FILM COATED ORAL
Qty: 90 | Refills: 0 | Status: ACTIVE | COMMUNITY
Start: 2020-01-30

## 2020-10-19 RX ORDER — OXYCODONE AND ACETAMINOPHEN 5; 325 MG/1; MG/1
5-325 TABLET ORAL
Qty: 20 | Refills: 0 | Status: DISCONTINUED | COMMUNITY
Start: 2017-08-02 | End: 2020-10-19

## 2020-10-19 RX ORDER — DILTIAZEM HYDROCHLORIDE 120 MG/1
120 CAPSULE, EXTENDED RELEASE ORAL
Qty: 90 | Refills: 0 | Status: ACTIVE | COMMUNITY
Start: 2020-09-15

## 2020-10-19 RX ORDER — OXYBUTYNIN CHLORIDE 15 MG/1
15 TABLET, EXTENDED RELEASE ORAL
Qty: 30 | Refills: 0 | Status: DISCONTINUED | COMMUNITY
Start: 2019-03-27 | End: 2020-10-19

## 2020-10-19 RX ORDER — SOLIFENACIN SUCCINATE 10 MG/1
10 TABLET, FILM COATED ORAL
Qty: 30 | Refills: 3 | Status: DISCONTINUED | COMMUNITY
Start: 2018-02-23 | End: 2020-10-19

## 2020-10-19 NOTE — ASSESSMENT
[FreeTextEntry1] : The patient is a 73 years old man with a history of CKD III here for evaluation\par \par Chronic Kidney Disease Stage III -- Based upon the history he has had long standing HTN and has followed with a nephrologist in [place].  Last cr at his PMD was 1.5 with k 5.8.   We will repeat his potassium today.  We spent most of the visit discussing chronic kidney disease, its stages, risk for progression and strategies for prevention including avoidance of NSAIDs and notifying me of medication changes.\par \par Hypertension -- The patient has hypertension in the setting of CKD.  He is well controlled and he will follow a strictly low salt diet.  He has been off his diet recently.  HE is going to speak with me again next week.  IF the swelling doesn't improve with going back on a low salt diet we will consider a diuretic which might work well with his hyperkalemia.

## 2020-10-19 NOTE — PHYSICAL EXAM
[General Appearance - In No Acute Distress] : in no acute distress [General Appearance - Alert] : alert [General Appearance - Well Nourished] : well nourished [Sclera] : the sclera and conjunctiva were normal [General Appearance - Well Developed] : well developed [Examination Of The Oral Cavity] : the lips and gums were normal [Hearing Threshold Finger Rub Not [place]] : hearing was normal [Oropharynx] : the oropharynx was normal [Neck Cervical Mass (___cm)] : no neck mass was observed [Neck Appearance] : the appearance of the neck was normal [Jugular Venous Distention Increased] : there was no jugular-venous distention [Exaggerated Use Of Accessory Muscles For Inspiration] : no accessory muscle use [Respiration, Rhythm And Depth] : normal respiratory rhythm and effort [Auscultation Breath Sounds / Voice Sounds] : lungs were clear to auscultation bilaterally [Heart Sounds Gallop] : no gallops [Heart Sounds] : normal S1 and S2 [FreeTextEntry1] : 2+ edema noted [Murmurs] : no murmurs [Heart Sounds Pericardial Friction Rub] : no pericardial rub [Abdomen Soft] : soft [Abdomen Tenderness] : non-tender [Cervical Lymph Nodes Enlarged Posterior Bilaterally] : posterior cervical [Abdomen Mass (___ Cm)] : no abdominal mass palpated [Cervical Lymph Nodes Enlarged Anterior Bilaterally] : anterior cervical [Supraclavicular Lymph Nodes Enlarged Bilaterally] : supraclavicular [No CVA Tenderness] : no ~M costovertebral angle tenderness [No Spinal Tenderness] : no spinal tenderness [Abnormal Walk] : normal gait [] : no rash [Oriented To Time, Place, And Person] : oriented to person, place, and time [Affect] : the affect was normal [Mood] : the mood was normal

## 2020-10-19 NOTE — REVIEW OF SYSTEMS
[Chills] : no chills [Fever] : no fever [Feeling Tired] : not feeling tired [Eyesight Problems] : no eyesight problems [Feeling Poorly] : not feeling poorly [Nosebleeds] : no nosebleeds [Chest Pain] : no chest pain [Shortness Of Breath] : no shortness of breath [Lower Ext Edema] : lower extremity edema [PND] : no PND [Orthopnea] : no orthopnea [Abdominal Pain] : no abdominal pain [Vomiting] : no vomiting [Dysuria] : no dysuria [Incontinence] : no incontinence [Fainting] : no fainting [Itching] : no itching [Dizziness] : no dizziness

## 2020-10-19 NOTE — HISTORY OF PRESENT ILLNESS
[FreeTextEntry1] : Today I had the pleasure of meeting Carlitos Lanza.  He is a 73 years old man who is here today with his wife.  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  Most recently he has been living in [facility].  He is now moved back home for good to [place] to help take care of his 100 years old mother.  He has had hypertension for the last 30 years and he has been on some form of medication since that time.  More recently, in 2007, he had prostate cancer s/p seed implants, urethral stricture, and multiple TURP.  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  He is here today for evaluation of an elevated creatinine.  The patient states that he first became aware of kidney disease about four or so years ago and his baseline creatinine seem to be about a 1.4.  Recently, he was admitted to [facility] with urosepsis at which point his creatinine was 1.9 but then came down to his baseline.  On my evaluation today he feels well denies complaints no CP no SOB no NVD.  He has no hematuria or dysuria.  Of note he has also recently had an angiogram and he has afib and now has a loop recorder.\par \par 2/1/19 -- Today I saw Ruiz Lanza and he is feeling pretty well overall.  He has not had any major intercurrent health issues.  He has had blood work and a full physical recently at his PMD.  He has been struggling somewhat with weight / diet but he has been trying to get to the gym.  He has lost some weight.\par \par 10/19/20 -- since our last visit Ruiz has had a few issues including afib requiring xarelto.  is in the process of moving and recently exepriencign swelling in his legs but no dyspnea no orthopnea.  diet has been very poor because he is in the process of moving.

## 2020-10-20 LAB
ALBUMIN SERPL ELPH-MCNC: 4.4 G/DL
ANION GAP SERPL CALC-SCNC: 12 MMOL/L
BUN SERPL-MCNC: 23 MG/DL
CALCIUM SERPL-MCNC: 9.5 MG/DL
CHLORIDE SERPL-SCNC: 106 MMOL/L
CO2 SERPL-SCNC: 25 MMOL/L
CREAT SERPL-MCNC: 1.49 MG/DL
CREAT SPEC-SCNC: 221 MG/DL
CREAT/PROT UR: 0.1 RATIO
GLUCOSE SERPL-MCNC: 92 MG/DL
PHOSPHATE SERPL-MCNC: 3.8 MG/DL
POTASSIUM SERPL-SCNC: 5 MMOL/L
PROT UR-MCNC: 13 MG/DL
SODIUM SERPL-SCNC: 143 MMOL/L

## 2020-12-04 ENCOUNTER — APPOINTMENT (OUTPATIENT)
Dept: UROLOGY | Facility: CLINIC | Age: 75
End: 2020-12-04
Payer: MEDICARE

## 2020-12-04 VITALS
SYSTOLIC BLOOD PRESSURE: 118 MMHG | WEIGHT: 290 LBS | HEIGHT: 72 IN | DIASTOLIC BLOOD PRESSURE: 81 MMHG | BODY MASS INDEX: 39.28 KG/M2 | HEART RATE: 57 BPM | RESPIRATION RATE: 17 BRPM

## 2020-12-04 DIAGNOSIS — Z87.448 PERSONAL HISTORY OF OTHER DISEASES OF URINARY SYSTEM: ICD-10-CM

## 2020-12-04 PROCEDURE — 99213 OFFICE O/P EST LOW 20 MIN: CPT

## 2020-12-04 NOTE — HISTORY OF PRESENT ILLNESS
[FreeTextEntry1] : Patient is a 76 yo M who presents for f/u of urethral stricture/prostate stenosis and urinary urgency.\par \par From urologic standpoint he has been doing well.  He has not had any gross hematuria in past year.  Last year he had cystoscopy showing radiation cystitis, unremarkable MRU 11/2019.\par \par LUTS are stable - does have urinary frequency q2 hrs and good FOS and control.  He is not taking ACh or medications.  He has minimal urgency/UUI - uses 1 depends per day.  He does at times note increased urinary frequency, especially when he is taking prednisone for a gout flare which has occurred several times in the past few months.  No dysuria.  No fever/chills.\par \par He has been following with Dr Pratt/Nephrology for CKD, Cr was stable 1.5 in 10/2020.\par \par \par PRIOR HX:\par He has h/o prostate cancer s/p brachy in 2007.  He developed urinary symptoms after radiation.  He had a TURP by Dr Scott, then states that he had a cystoscopy showing urethral stricture and developed AUR thereafter.  He then had catheter for several wks, ultimately had another TURP in 2014.  Most recent TURP (#3) in 10/2015 by Dr Kate.  He was recommended to have hyperbaric oxygen after 2nd TURP, but did not and ultimately underwent third TURP.   These events occurred in [place].  He has had two episodes of AUR, last in 2012.  He reports his LUTS are mainly weak stream, urinary frequency, nocturia x2.  He has been on flomax in past. Cysto and RUG here showed a proximal stricture from BM urethra to mid prostate.  Most recent PSA 1/2018 0.01.\par \par S/p mini-TURP, urethral dilation, injection of kenalog. He is dry at night sleeping, but has nocturia x2-3.  In daytime his control has stabilized, using 1 pad daily.   Good FOS and standing with urination.  Previously spraying with urination and weak stream and had to sit to void.\par \par Tried oxybutynin and vesicare without success for for urgency, minimal UUI.  He does not want botox procedure.  Only has UUI if he waits a long time to void.

## 2020-12-04 NOTE — ASSESSMENT
[FreeTextEntry1] : Patient is a 76 yo M who presents with recurrent/recalcitrant prostatic urethral stenosis from prostate cancer radiation.  Obstructive voiding symptoms now improved after TURP, urethral dilation, injection of kenalog.\par \par Recent intermittent gross hematuria. No recent hematuria.\par Will check urine cytology.\par Will check PSA\par F/u 1 yr

## 2020-12-07 ENCOUNTER — APPOINTMENT (OUTPATIENT)
Age: 75
End: 2020-12-07

## 2020-12-07 LAB
APPEARANCE: CLEAR
BILIRUBIN URINE: NEGATIVE
BLOOD URINE: NEGATIVE
COLOR: YELLOW
GLUCOSE QUALITATIVE U: NEGATIVE
KETONES URINE: NEGATIVE
LEUKOCYTE ESTERASE URINE: NEGATIVE
NITRITE URINE: NEGATIVE
PH URINE: 6
PROTEIN URINE: NORMAL
PSA SERPL-MCNC: <0.01 NG/ML
SPECIFIC GRAVITY URINE: 1.03
UROBILINOGEN URINE: NORMAL

## 2020-12-09 LAB — URINE CYTOLOGY: NORMAL

## 2020-12-16 PROBLEM — N39.0 ACUTE UTI: Status: RESOLVED | Noted: 2018-07-13 | Resolved: 2020-12-16

## 2021-01-12 ENCOUNTER — TRANSCRIPTION ENCOUNTER (OUTPATIENT)
Age: 76
End: 2021-01-12

## 2021-05-10 ENCOUNTER — APPOINTMENT (OUTPATIENT)
Dept: NEPHROLOGY | Facility: CLINIC | Age: 76
End: 2021-05-10
Payer: MEDICARE

## 2021-05-10 VITALS
DIASTOLIC BLOOD PRESSURE: 78 MMHG | OXYGEN SATURATION: 96 % | TEMPERATURE: 98.2 F | SYSTOLIC BLOOD PRESSURE: 119 MMHG | HEART RATE: 59 BPM | WEIGHT: 308.64 LBS | BODY MASS INDEX: 41.86 KG/M2

## 2021-05-10 PROCEDURE — 99214 OFFICE O/P EST MOD 30 MIN: CPT

## 2021-05-10 RX ORDER — ECONAZOLE NITRATE 10 MG/G
1 CREAM TOPICAL
Qty: 85 | Refills: 0 | Status: ACTIVE | COMMUNITY
Start: 2021-01-25

## 2021-05-10 RX ORDER — FLUOCINONIDE 0.5 MG/ML
0.05 SOLUTION TOPICAL
Qty: 60 | Refills: 0 | Status: ACTIVE | COMMUNITY
Start: 2021-03-03

## 2021-05-10 RX ORDER — PREDNISOLONE ACETATE 10 MG/ML
1 SUSPENSION/ DROPS OPHTHALMIC
Qty: 5 | Refills: 0 | Status: ACTIVE | COMMUNITY
Start: 2020-11-17

## 2021-05-10 RX ORDER — KETOCONAZOLE 20.5 MG/ML
2 SHAMPOO, SUSPENSION TOPICAL
Qty: 120 | Refills: 0 | Status: ACTIVE | COMMUNITY
Start: 2021-03-03

## 2021-05-10 NOTE — REVIEW OF SYSTEMS
[Fever] : no fever [Chills] : no chills [Feeling Poorly] : not feeling poorly [Feeling Tired] : not feeling tired [Eyesight Problems] : no eyesight problems [Nosebleeds] : no nosebleeds [Chest Pain] : no chest pain [Lower Ext Edema] : no extremity edema [Shortness Of Breath] : no shortness of breath [Orthopnea] : no orthopnea [PND] : no PND [Abdominal Pain] : no abdominal pain [Vomiting] : no vomiting [Dysuria] : no dysuria [Incontinence] : no incontinence [Itching] : no itching [Dizziness] : no dizziness [Fainting] : no fainting

## 2021-05-10 NOTE — ASSESSMENT
[FreeTextEntry1] : The patient is a 76 years old man with a history of CKD III here for evaluation\par \par Chronic Kidney Disease Stage III -- Based upon the history he has had long standing HTN and has followed with a nephrologist in [place].   We will repeat his potassium today.  We spent most of the visit discussing chronic kidney disease, its stages, risk for progression and strategies for prevention including avoidance of NSAIDs and notifying me of medication changes.  In particular we spoke about the benefit of exercise.\par \par Hypertension -- The patient has hypertension in the setting of CKD.  He is well controlled and he will follow a strictly low salt diet.  He has been off his diet recently.  HE is going to speak with me again next week.  He will continue his diet regimen.  The patient is on lisinopril and therefore is at risk for hyperkalemia and so we must monitor serum potassium today.\par

## 2021-05-10 NOTE — HISTORY OF PRESENT ILLNESS
[FreeTextEntry1] : Today I had the pleasure of meeting Carlitos Lanza.  He is a 73 years old man who is here today with his wife.  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  Most recently he has been living in [facility].  He is now moved back home for good to [place] to help take care of his 100 years old mother.  He has had hypertension for the last 30 years and he has been on some form of medication since that time.  More recently, in 2007, he had prostate cancer s/p seed implants, urethral stricture, and multiple TURP.  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  He is here today for evaluation of an elevated creatinine.  The patient states that he first became aware of kidney disease about four or so years ago and his baseline creatinine seem to be about a 1.4.  Recently, he was admitted to [facility] with urosepsis at which point his creatinine was 1.9 but then came down to his baseline.  On my evaluation today he feels well denies complaints no CP no SOB no NVD.  He has no hematuria or dysuria.  Of note he has also recently had an angiogram and he has afib and now has a loop recorder.\par \par 2/1/19 -- Today I saw Ruiz Lanza and he is feeling pretty well overall.  He has not had any major intercurrent health issues.  He has had blood work and a full physical recently at his PMD.  He has been struggling somewhat with weight / diet but he has been trying to get to the gym.  He has lost some weight.\par \par 10/19/20 -- since our last visit Ruiz has had a few issues including afib requiring xarelto.  is in the process of moving and recently exepriencign swelling in his legs but no dyspnea no orthopnea.  diet has been very poor because he is in the process of moving.  \par \par 5/10/21 -- Since our last visit ruiz is doing ok.  no urinary symptoms.  he has been back to the gym and is exercising more.  Has no chest pain no shortness of breath.

## 2021-05-10 NOTE — PHYSICAL EXAM
[General Appearance - Alert] : alert [General Appearance - In No Acute Distress] : in no acute distress [General Appearance - Well Nourished] : well nourished [General Appearance - Well Developed] : well developed [Sclera] : the sclera and conjunctiva were normal [Hearing Threshold Finger Rub Not Sharkey] : hearing was normal [Oropharynx] : the oropharynx was normal [Examination Of The Oral Cavity] : the lips and gums were normal [Neck Appearance] : the appearance of the neck was normal [Neck Cervical Mass (___cm)] : no neck mass was observed [Jugular Venous Distention Increased] : there was no jugular-venous distention [Respiration, Rhythm And Depth] : normal respiratory rhythm and effort [Exaggerated Use Of Accessory Muscles For Inspiration] : no accessory muscle use [Auscultation Breath Sounds / Voice Sounds] : lungs were clear to auscultation bilaterally [Heart Sounds] : normal S1 and S2 [Heart Sounds Gallop] : no gallops [Murmurs] : no murmurs [Heart Sounds Pericardial Friction Rub] : no pericardial rub [FreeTextEntry1] : 1+ edema noted [Abdomen Soft] : soft [Abdomen Tenderness] : non-tender [Abdomen Mass (___ Cm)] : no abdominal mass palpated [Cervical Lymph Nodes Enlarged Posterior Bilaterally] : posterior cervical [Cervical Lymph Nodes Enlarged Anterior Bilaterally] : anterior cervical [Supraclavicular Lymph Nodes Enlarged Bilaterally] : supraclavicular [No CVA Tenderness] : no ~M costovertebral angle tenderness [No Spinal Tenderness] : no spinal tenderness [Abnormal Walk] : normal gait [] : no rash [Oriented To Time, Place, And Person] : oriented to person, place, and time [Affect] : the affect was normal [Mood] : the mood was normal

## 2021-05-11 ENCOUNTER — NON-APPOINTMENT (OUTPATIENT)
Age: 76
End: 2021-05-11

## 2021-05-11 LAB
ALBUMIN SERPL ELPH-MCNC: 4.6 G/DL
ANION GAP SERPL CALC-SCNC: 11 MMOL/L
BUN SERPL-MCNC: 32 MG/DL
CALCIUM SERPL-MCNC: 9.5 MG/DL
CHLORIDE SERPL-SCNC: 106 MMOL/L
CO2 SERPL-SCNC: 24 MMOL/L
CREAT SERPL-MCNC: 1.62 MG/DL
CREAT SPEC-SCNC: 79 MG/DL
CREAT/PROT UR: 0.1 RATIO
GLUCOSE SERPL-MCNC: 93 MG/DL
PHOSPHATE SERPL-MCNC: 4.6 MG/DL
POTASSIUM SERPL-SCNC: 4.9 MMOL/L
PROT UR-MCNC: 5 MG/DL
SODIUM SERPL-SCNC: 141 MMOL/L

## 2021-08-19 ENCOUNTER — NON-APPOINTMENT (OUTPATIENT)
Age: 76
End: 2021-08-19

## 2021-11-08 ENCOUNTER — APPOINTMENT (OUTPATIENT)
Dept: NEPHROLOGY | Facility: CLINIC | Age: 76
End: 2021-11-08
Payer: MEDICARE

## 2021-11-08 VITALS
HEIGHT: 72 IN | SYSTOLIC BLOOD PRESSURE: 135 MMHG | WEIGHT: 303.13 LBS | DIASTOLIC BLOOD PRESSURE: 80 MMHG | BODY MASS INDEX: 41.06 KG/M2 | OXYGEN SATURATION: 97 % | HEART RATE: 92 BPM | TEMPERATURE: 97.2 F

## 2021-11-08 PROCEDURE — 99214 OFFICE O/P EST MOD 30 MIN: CPT

## 2021-11-08 RX ORDER — METOPROLOL TARTRATE 100 MG/1
100 TABLET, FILM COATED ORAL
Refills: 0 | Status: ACTIVE | COMMUNITY
Start: 2020-01-22

## 2021-11-08 NOTE — ASSESSMENT
[FreeTextEntry1] : The patient is a 76 years old man with a history of CKD III here for evaluation\par \par Chronic Kidney Disease Stage IIIa -- Based upon the history he has had long standing HTN and has followed with a nephrologist in [place].   We will repeat his potassium today.  We have previously discussed chronic kidney disease, its stages, risk for progression and strategies for prevention including avoidance of NSAIDs and notifying me of medication changes.  In particular we spoke about the benefit of exercise.  Today we will repeat his eGFR with cystatin C.\par \par Hypertension -- The patient has hypertension in the setting of CKD.  He is well controlled and he will follow a strictly low salt diet.  He has been off his diet recently but has been exercising.  He will continue his diet regimen.  The patient is on lisinopril and therefore is at risk for hyperkalemia and so we must monitor serum potassium today.  Medication changes noted.\par \par RTO 6 months or sooner if needed.  \par

## 2021-11-08 NOTE — PHYSICAL EXAM
[General Appearance - Alert] : alert [General Appearance - In No Acute Distress] : in no acute distress [General Appearance - Well Nourished] : well nourished [General Appearance - Well Developed] : well developed [Sclera] : the sclera and conjunctiva were normal [Hearing Threshold Finger Rub Not Dickens] : hearing was normal [Neck Appearance] : the appearance of the neck was normal [Neck Cervical Mass (___cm)] : no neck mass was observed [Jugular Venous Distention Increased] : there was no jugular-venous distention [Respiration, Rhythm And Depth] : normal respiratory rhythm and effort [Exaggerated Use Of Accessory Muscles For Inspiration] : no accessory muscle use [Auscultation Breath Sounds / Voice Sounds] : lungs were clear to auscultation bilaterally [Heart Sounds] : normal S1 and S2 [Heart Sounds Gallop] : no gallops [Murmurs] : no murmurs [Heart Sounds Pericardial Friction Rub] : no pericardial rub [Edema] : there was no peripheral edema [Abdomen Soft] : soft [Abdomen Tenderness] : non-tender [Abdomen Mass (___ Cm)] : no abdominal mass palpated [Cervical Lymph Nodes Enlarged Posterior Bilaterally] : posterior cervical [Cervical Lymph Nodes Enlarged Anterior Bilaterally] : anterior cervical [Supraclavicular Lymph Nodes Enlarged Bilaterally] : supraclavicular [No CVA Tenderness] : no ~M costovertebral angle tenderness [No Spinal Tenderness] : no spinal tenderness [Abnormal Walk] : normal gait [] : no rash [Oriented To Time, Place, And Person] : oriented to person, place, and time [Affect] : the affect was normal [Mood] : the mood was normal

## 2021-11-08 NOTE — HISTORY OF PRESENT ILLNESS
[FreeTextEntry1] : Today I had the pleasure of meeting Carlitos Lanza.  He is a 73 years old man who is here today with his wife.  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  Most recently he has been living in [facility].  He is now moved back home for good to [place] to help take care of his 100 years old mother.  He has had hypertension for the last 30 years and he has been on some form of medication since that time.  More recently, in 2007, he had prostate cancer s/p seed implants, urethral stricture, and multiple TURP.  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  He is here today for evaluation of an elevated creatinine.  The patient states that he first became aware of kidney disease about four or so years ago and his baseline creatinine seem to be about a 1.4.  Recently, he was admitted to [facility] with urosepsis at which point his creatinine was 1.9 but then came down to his baseline.  On my evaluation today he feels well denies complaints no CP no SOB no NVD.  He has no hematuria or dysuria.  Of note he has also recently had an angiogram and he has afib and now has a loop recorder.\par \par 2/1/19 -- Today I saw Ruiz Lanza and he is feeling pretty well overall.  He has not had any major intercurrent health issues.  He has had blood work and a full physical recently at his PMD.  He has been struggling somewhat with weight / diet but he has been trying to get to the gym.  He has lost some weight.\par \par 10/19/20 -- since our last visit Ruiz has had a few issues including afib requiring xarelto.  is in the process of moving and recently exepriencign swelling in his legs but no dyspnea no orthopnea.  diet has been very poor because he is in the process of moving.  \par \par 5/10/21 -- Since our last visit ruiz is doing ok.  no urinary symptoms.  he has been back to the gym and is exercising more.  Has no chest pain no shortness of breath.\par \par 11/8/21 -- I saw Carlitos today along with his wife.  He is doing well and presently denies complaints.  He is exercising but continues to struggle with diet.

## 2021-11-09 ENCOUNTER — NON-APPOINTMENT (OUTPATIENT)
Age: 76
End: 2021-11-09

## 2021-11-09 LAB
ALBUMIN SERPL ELPH-MCNC: 4.4 G/DL
ANION GAP SERPL CALC-SCNC: 12 MMOL/L
BASOPHILS # BLD AUTO: 0.05 K/UL
BASOPHILS NFR BLD AUTO: 0.6 %
BUN SERPL-MCNC: 27 MG/DL
CALCIUM SERPL-MCNC: 9.3 MG/DL
CALCIUM SERPL-MCNC: 9.3 MG/DL
CHLORIDE SERPL-SCNC: 106 MMOL/L
CO2 SERPL-SCNC: 22 MMOL/L
CREAT SERPL-MCNC: 1.74 MG/DL
CREAT SPEC-SCNC: 175 MG/DL
CREAT/PROT UR: 0 RATIO
CYSTATIN C SERPL-MCNC: 1.63 MG/L
EOSINOPHIL # BLD AUTO: 0.12 K/UL
EOSINOPHIL NFR BLD AUTO: 1.5 %
ESTIMATED AVERAGE GLUCOSE: 117 MG/DL
FERRITIN SERPL-MCNC: 38 NG/ML
GFR/BSA.PRED SERPLBLD CYS-BASED-ARV: 38 ML/MIN
GLUCOSE SERPL-MCNC: 105 MG/DL
HBA1C MFR BLD HPLC: 5.7 %
HCT VFR BLD CALC: 39.1 %
HGB BLD-MCNC: 12.5 G/DL
IMM GRANULOCYTES NFR BLD AUTO: 0.4 %
IRON SATN MFR SERPL: 27 %
IRON SERPL-MCNC: 94 UG/DL
LYMPHOCYTES # BLD AUTO: 2.24 K/UL
LYMPHOCYTES NFR BLD AUTO: 28.2 %
MAN DIFF?: NORMAL
MCHC RBC-ENTMCNC: 28.7 PG
MCHC RBC-ENTMCNC: 32 GM/DL
MCV RBC AUTO: 89.9 FL
MONOCYTES # BLD AUTO: 0.76 K/UL
MONOCYTES NFR BLD AUTO: 9.6 %
NEUTROPHILS # BLD AUTO: 4.75 K/UL
NEUTROPHILS NFR BLD AUTO: 59.7 %
PARATHYROID HORMONE INTACT: 42 PG/ML
PHOSPHATE SERPL-MCNC: 3.6 MG/DL
PLATELET # BLD AUTO: 225 K/UL
POTASSIUM SERPL-SCNC: 5.4 MMOL/L
PROT UR-MCNC: 6 MG/DL
RBC # BLD: 4.35 M/UL
RBC # FLD: 15.2 %
SODIUM SERPL-SCNC: 139 MMOL/L
TIBC SERPL-MCNC: 341 UG/DL
UIBC SERPL-MCNC: 248 UG/DL
WBC # FLD AUTO: 7.95 K/UL

## 2021-11-18 LAB
ALBUMIN SERPL ELPH-MCNC: 4.6 G/DL
ANION GAP SERPL CALC-SCNC: 15 MMOL/L
BUN SERPL-MCNC: 30 MG/DL
CALCIUM SERPL-MCNC: 9.5 MG/DL
CHLORIDE SERPL-SCNC: 105 MMOL/L
CO2 SERPL-SCNC: 20 MMOL/L
CREAT SERPL-MCNC: 1.82 MG/DL
GLUCOSE SERPL-MCNC: 124 MG/DL
PHOSPHATE SERPL-MCNC: 3 MG/DL
POTASSIUM SERPL-SCNC: 5.7 MMOL/L
SODIUM SERPL-SCNC: 139 MMOL/L

## 2021-11-30 LAB
ALBUMIN SERPL ELPH-MCNC: 4.4 G/DL
ANION GAP SERPL CALC-SCNC: 14 MMOL/L
BUN SERPL-MCNC: 25 MG/DL
CALCIUM SERPL-MCNC: 9.7 MG/DL
CHLORIDE SERPL-SCNC: 105 MMOL/L
CO2 SERPL-SCNC: 21 MMOL/L
CREAT SERPL-MCNC: 1.66 MG/DL
GLUCOSE SERPL-MCNC: 128 MG/DL
PHOSPHATE SERPL-MCNC: 3.6 MG/DL
POTASSIUM SERPL-SCNC: 4.9 MMOL/L
SODIUM SERPL-SCNC: 140 MMOL/L

## 2021-12-03 ENCOUNTER — APPOINTMENT (OUTPATIENT)
Dept: UROLOGY | Facility: CLINIC | Age: 76
End: 2021-12-03
Payer: MEDICARE

## 2021-12-03 VITALS
HEART RATE: 70 BPM | RESPIRATION RATE: 17 BRPM | TEMPERATURE: 97.4 F | DIASTOLIC BLOOD PRESSURE: 79 MMHG | SYSTOLIC BLOOD PRESSURE: 137 MMHG

## 2021-12-03 PROCEDURE — 99213 OFFICE O/P EST LOW 20 MIN: CPT

## 2021-12-03 NOTE — HISTORY OF PRESENT ILLNESS
[FreeTextEntry1] : Patient is a 77 yo M who presents for f/u of urethral stricture/prostate stenosis and urinary urgency.\par \par From urologic standpoint he has been doing well/stable.  He has not had any gross hematuria in past 2 years.  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  He is not taking ACh or medications.  He has minimal urgency/UUI - uses 1 depends per day.  He does at times note increased urinary frequency, especially when he is taking prednisone for a gout flare which has occurred several times in the past few months.  No dysuria.  No fever/chills.  \par \par He has been following with Dr Pratt/Nephrology for CKD, Cr was relatively stable 1.66 in 11/2021.  K was elevated, lisinopril was reduced by Dr Pratt and K has improved.\par \par \par PRIOR HX:\par He has h/o prostate cancer s/p brachy in 2007.  He developed urinary symptoms after radiation.  He had a TURP by Dr Scott, then states that he had a cystoscopy showing urethral stricture and developed AUR thereafter.  He then had catheter for several wks, ultimately had another TURP in 2014.  Most recent TURP (#3) in 10/2015 by Dr Kate.  He was recommended to have hyperbaric oxygen after 2nd TURP, but did not and ultimately underwent third TURP.   These events occurred in [place].  He has had two episodes of AUR, last in 2012.  He reports his LUTS are mainly weak stream, urinary frequency, nocturia x2.  He has been on flomax in past. Cysto and RUG here showed a proximal stricture from BM urethra to mid prostate.  Most recent PSA 1/2018 0.01.\par \par S/p mini-TURP, urethral dilation, injection of kenalog. He is dry at night sleeping, but has nocturia x2-3.  In daytime his control has stabilized, using 1 pad daily.   Good FOS and standing with urination.  Previously spraying with urination and weak stream and had to sit to void.\par \par Tried oxybutynin and vesicare without success for for urgency, minimal UUI.  He does not want botox procedure.  Only has UUI if he waits a long time to void.

## 2021-12-03 NOTE — ASSESSMENT
[FreeTextEntry1] : Patient is a 75 yo M who presents with recurrent/recalcitrant prostatic urethral stenosis after radiation for prostate cancer.  Obstructive voiding symptoms now improved after TURP, urethral dilation, injection of kenalog.\par \par No hematuria recently. Relatively stable CKD Cr 1.6 range\par PSA today\par PVR today\par F/u 1 yr

## 2021-12-06 ENCOUNTER — APPOINTMENT (OUTPATIENT)
Age: 76
End: 2021-12-06

## 2021-12-10 ENCOUNTER — APPOINTMENT (OUTPATIENT)
Age: 76
End: 2021-12-10

## 2021-12-10 LAB — PSA SERPL-MCNC: <0.01 NG/ML

## 2022-01-26 ENCOUNTER — NON-APPOINTMENT (OUTPATIENT)
Age: 77
End: 2022-01-26

## 2022-01-31 ENCOUNTER — RX RENEWAL (OUTPATIENT)
Age: 77
End: 2022-01-31

## 2022-04-05 LAB
ALBUMIN SERPL ELPH-MCNC: 4.5 G/DL
ANION GAP SERPL CALC-SCNC: 13 MMOL/L
BUN SERPL-MCNC: 19 MG/DL
CALCIUM SERPL-MCNC: 9.5 MG/DL
CHLORIDE SERPL-SCNC: 104 MMOL/L
CO2 SERPL-SCNC: 23 MMOL/L
CREAT SERPL-MCNC: 1.77 MG/DL
EGFR: 39 ML/MIN/1.73M2
GLUCOSE SERPL-MCNC: 106 MG/DL
PHOSPHATE SERPL-MCNC: 3.4 MG/DL
POTASSIUM SERPL-SCNC: 5 MMOL/L
SODIUM SERPL-SCNC: 140 MMOL/L

## 2022-05-09 ENCOUNTER — APPOINTMENT (OUTPATIENT)
Dept: NEPHROLOGY | Facility: CLINIC | Age: 77
End: 2022-05-09
Payer: MEDICARE

## 2022-05-09 VITALS — SYSTOLIC BLOOD PRESSURE: 121 MMHG | DIASTOLIC BLOOD PRESSURE: 77 MMHG | HEART RATE: 64 BPM

## 2022-05-09 VITALS — SYSTOLIC BLOOD PRESSURE: 150 MMHG | DIASTOLIC BLOOD PRESSURE: 83 MMHG

## 2022-05-09 DIAGNOSIS — N18.31 CHRONIC KIDNEY DISEASE, STAGE 3A: ICD-10-CM

## 2022-05-09 DIAGNOSIS — N18.30 CHRONIC KIDNEY DISEASE, STAGE 3 UNSPECIFIED: ICD-10-CM

## 2022-05-09 PROCEDURE — 99214 OFFICE O/P EST MOD 30 MIN: CPT

## 2022-05-09 RX ORDER — HYDRALAZINE HYDROCHLORIDE 25 MG/1
25 TABLET ORAL
Refills: 0 | Status: ACTIVE | COMMUNITY
Start: 2020-07-06

## 2022-05-09 RX ORDER — PATIROMER 8.4 G/1
8.4 POWDER, FOR SUSPENSION ORAL DAILY
Qty: 16 | Refills: 2 | Status: DISCONTINUED | COMMUNITY
Start: 2022-03-10 | End: 2022-05-09

## 2022-05-09 NOTE — HISTORY OF PRESENT ILLNESS
[FreeTextEntry1] : Today I had the pleasure of meeting Carlitos Lanza.  He is a 73 years old man who is here today with his wife.  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  Most recently he has been living in [facility].  He is now moved back home for good to [place] to help take care of his 100 years old mother.  He has had hypertension for the last 30 years and he has been on some form of medication since that time.  More recently, in 2007, he had prostate cancer s/p seed implants, urethral stricture, and multiple TURP.  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  He is here today for evaluation of an elevated creatinine.  The patient states that he first became aware of kidney disease about four or so years ago and his baseline creatinine seem to be about a 1.4.  Recently, he was admitted to [facility] with urosepsis at which point his creatinine was 1.9 but then came down to his baseline.  On my evaluation today he feels well denies complaints no CP no SOB no NVD.  He has no hematuria or dysuria.  Of note he has also recently had an angiogram and he has afib and now has a loop recorder.\par \par 2/1/19 -- Today I saw Ruiz Lanza and he is feeling pretty well overall.  He has not had any major intercurrent health issues.  He has had blood work and a full physical recently at his PMD.  He has been struggling somewhat with weight / diet but he has been trying to get to the gym.  He has lost some weight.\par \par 10/19/20 -- since our last visit Ruiz has had a few issues including afib requiring xarelto.  is in the process of moving and recently exepriencign swelling in his legs but no dyspnea no orthopnea.  diet has been very poor because he is in the process of moving.  \par \par 5/10/21 -- Since our last visit ruiz is doing ok.  no urinary symptoms.  he has been back to the gym and is exercising more.  Has no chest pain no shortness of breath.\par \par 11/8/21 -- I saw Carlitos today along with his wife.  He is doing well and presently denies complaints.  He is exercising but continues to struggle with diet.  \par \par 5/9/22 -- Has been losing weight.  Got an implantable loop recorder site was infected.  Had one day recently where ankles swelled but improved.  Reviewed home BP log BP mostly in the 130 range

## 2022-05-09 NOTE — PHYSICAL EXAM
[General Appearance - Alert] : alert [General Appearance - In No Acute Distress] : in no acute distress [General Appearance - Well Nourished] : well nourished [General Appearance - Well Developed] : well developed [Sclera] : the sclera and conjunctiva were normal [Hearing Threshold Finger Rub Not [place]] : hearing was normal [Neck Appearance] : the appearance of the neck was normal [Neck Cervical Mass (___cm)] : no neck mass was observed [Jugular Venous Distention Increased] : there was no jugular-venous distention [Respiration, Rhythm And Depth] : normal respiratory rhythm and effort [Exaggerated Use Of Accessory Muscles For Inspiration] : no accessory muscle use [Auscultation Breath Sounds / Voice Sounds] : lungs were clear to auscultation bilaterally [Heart Sounds] : normal S1 and S2 [Heart Sounds Gallop] : no gallops [Murmurs] : no murmurs [Heart Sounds Pericardial Friction Rub] : no pericardial rub [___ +] : bilateral [unfilled]+ pitting edema to the ankles [Abdomen Soft] : soft [Abdomen Tenderness] : non-tender [Abdomen Mass (___ Cm)] : no abdominal mass palpated [Cervical Lymph Nodes Enlarged Anterior Bilaterally] : anterior cervical [No CVA Tenderness] : no ~M costovertebral angle tenderness [No Spinal Tenderness] : no spinal tenderness [Abnormal Walk] : normal gait [] : no rash [Oriented To Time, Place, And Person] : oriented to person, place, and time [Affect] : the affect was normal [Mood] : the mood was normal

## 2022-05-09 NOTE — ASSESSMENT
[FreeTextEntry1] : The patient is a 77 years old man with a history of CKD III here for evaluation\par \par Chronic Kidney Disease Stage IIIb -- Based upon the history he has had long standing HTN and has followed with a nephrologist in [place].   We will repeat his potassium today.  We have discussed chronic kidney disease, its stages, risk for progression and strategies for prevention including avoidance of NSAIDs and notifying me of medication changes.  In particular we spoke about the benefit of blood pressure control and weight loss.  I examined the infected site at his loop recorder, reports pain erythema.  He asked if levofloxacin 250 mg is acceptable for his kidney function which it is.\par \par Hypertension -- The patient has hypertension in the setting of CKD.  He is well controlled and he will follow a strictly low salt diet.  He will continue his diet regimen.  The patient is on lisinopril and therefore is at risk for hyperkalemia and so we must monitor serum potassium today.  Home BP log reviewed.\par \par Hyperkalemia -- Patient is having trouble affording lokelma he is presently taking it QOD.  Will check potassium today.  Low K diet.\par \par RTO 6 months or sooner if needed.  \par

## 2022-05-09 NOTE — REVIEW OF SYSTEMS
[Fever] : no fever [Chills] : no chills [Feeling Poorly] : not feeling poorly [Feeling Tired] : not feeling tired [Eyesight Problems] : no eyesight problems [Nosebleeds] : no nosebleeds [As noted in HPI] : as noted in HPI [Chest Pain] : no chest pain [Shortness Of Breath] : no shortness of breath [Orthopnea] : no orthopnea [PND] : no PND [Abdominal Pain] : no abdominal pain [Vomiting] : no vomiting [Dysuria] : no dysuria [Incontinence] : no incontinence [As Noted in HPI] : as noted in HPI [Itching] : no itching [Dizziness] : no dizziness [Fainting] : no fainting

## 2022-05-10 ENCOUNTER — NON-APPOINTMENT (OUTPATIENT)
Age: 77
End: 2022-05-10

## 2022-05-10 LAB
ALBUMIN SERPL ELPH-MCNC: 4.6 G/DL
ANION GAP SERPL CALC-SCNC: 14 MMOL/L
BUN SERPL-MCNC: 28 MG/DL
CALCIUM SERPL-MCNC: 9.8 MG/DL
CHLORIDE SERPL-SCNC: 106 MMOL/L
CO2 SERPL-SCNC: 22 MMOL/L
CREAT SERPL-MCNC: 1.72 MG/DL
CREAT SPEC-SCNC: 147 MG/DL
CREAT/PROT UR: 0.2 RATIO
EGFR: 40 ML/MIN/1.73M2
GLUCOSE SERPL-MCNC: 98 MG/DL
PHOSPHATE SERPL-MCNC: 4.3 MG/DL
POTASSIUM SERPL-SCNC: 5.4 MMOL/L
PROT UR-MCNC: 26 MG/DL
SODIUM SERPL-SCNC: 142 MMOL/L

## 2022-05-20 LAB
ALBUMIN SERPL ELPH-MCNC: 4.7 G/DL
ANION GAP SERPL CALC-SCNC: 15 MMOL/L
BUN SERPL-MCNC: 22 MG/DL
CALCIUM SERPL-MCNC: 9.5 MG/DL
CHLORIDE SERPL-SCNC: 104 MMOL/L
CO2 SERPL-SCNC: 21 MMOL/L
CREAT SERPL-MCNC: 1.66 MG/DL
EGFR: 42 ML/MIN/1.73M2
GLUCOSE SERPL-MCNC: 96 MG/DL
PHOSPHATE SERPL-MCNC: 4.1 MG/DL
POTASSIUM SERPL-SCNC: 5.7 MMOL/L
SODIUM SERPL-SCNC: 140 MMOL/L

## 2022-06-03 LAB
ALBUMIN SERPL ELPH-MCNC: 4.5 G/DL
ANION GAP SERPL CALC-SCNC: 13 MMOL/L
BUN SERPL-MCNC: 23 MG/DL
CALCIUM SERPL-MCNC: 9.5 MG/DL
CHLORIDE SERPL-SCNC: 105 MMOL/L
CO2 SERPL-SCNC: 21 MMOL/L
CREAT SERPL-MCNC: 1.7 MG/DL
EGFR: 40 ML/MIN/1.73M2
GLUCOSE SERPL-MCNC: 96 MG/DL
PHOSPHATE SERPL-MCNC: 4.2 MG/DL
POTASSIUM SERPL-SCNC: 5.6 MMOL/L
SODIUM SERPL-SCNC: 139 MMOL/L

## 2022-06-20 ENCOUNTER — NON-APPOINTMENT (OUTPATIENT)
Age: 77
End: 2022-06-20

## 2022-06-23 ENCOUNTER — APPOINTMENT (OUTPATIENT)
Dept: ORTHOPEDIC SURGERY | Facility: CLINIC | Age: 77
End: 2022-06-23
Payer: MEDICARE

## 2022-06-23 VITALS — HEIGHT: 72 IN | WEIGHT: 297 LBS | BODY MASS INDEX: 40.23 KG/M2

## 2022-06-23 PROCEDURE — 73502 X-RAY EXAM HIP UNI 2-3 VIEWS: CPT | Mod: RT

## 2022-06-23 PROCEDURE — 73551 X-RAY EXAM OF FEMUR 1: CPT | Mod: RT

## 2022-06-23 PROCEDURE — 99203 OFFICE O/P NEW LOW 30 MIN: CPT

## 2022-06-23 NOTE — PHYSICAL EXAM
[Right] : right hip with pelvis [AP] : anteroposterior [Lateral] : lateral [There are no fractures, subluxations or dislocations. No significant abnormalities are seen] : There are no fractures, subluxations or dislocations. No significant abnormalities are seen [] : no swelling [FreeTextEntry9] : femur negative(OA medial knee)

## 2022-06-23 NOTE — HISTORY OF PRESENT ILLNESS
[0] : 0 [Localized] : localized [Frequent] : frequent [de-identified] : Injured himself swinging golf club [place] driving range 12 days ago. First felt a twinge in thigh, then felt it pop. Developed some discoloration in medial thigh by knee [] : no [FreeTextEntry1] : right thigh  [FreeTextEntry3] : 6/12/22 [FreeTextEntry5] : patient felt a pop in the leg while playing golf. He has had pain since.

## 2022-06-27 LAB
ALBUMIN SERPL ELPH-MCNC: 4.3 G/DL
ANION GAP SERPL CALC-SCNC: 11 MMOL/L
BUN SERPL-MCNC: 25 MG/DL
CALCIUM SERPL-MCNC: 9.6 MG/DL
CHLORIDE SERPL-SCNC: 104 MMOL/L
CO2 SERPL-SCNC: 24 MMOL/L
CREAT SERPL-MCNC: 1.69 MG/DL
EGFR: 41 ML/MIN/1.73M2
GLUCOSE SERPL-MCNC: 94 MG/DL
PHOSPHATE SERPL-MCNC: 4.3 MG/DL
POTASSIUM SERPL-SCNC: 5.3 MMOL/L
SODIUM SERPL-SCNC: 139 MMOL/L

## 2022-07-12 RX ORDER — EPLERENONE 25 MG/1
25 TABLET, COATED ORAL DAILY
Refills: 0 | Status: ACTIVE | COMMUNITY
Start: 2020-02-26

## 2022-07-21 ENCOUNTER — APPOINTMENT (OUTPATIENT)
Dept: ORTHOPEDIC SURGERY | Facility: CLINIC | Age: 77
End: 2022-07-21

## 2022-07-21 VITALS — BODY MASS INDEX: 40.23 KG/M2 | WEIGHT: 297 LBS | HEIGHT: 72 IN

## 2022-07-21 DIAGNOSIS — S76.211D STRAIN OF ADDUCTOR MUSCLE, FASCIA AND TENDON OF RIGHT THIGH, SUBSEQUENT ENCOUNTER: ICD-10-CM

## 2022-07-21 PROCEDURE — 99213 OFFICE O/P EST LOW 20 MIN: CPT

## 2022-07-21 NOTE — PHYSICAL EXAM
[AP] : anteroposterior [Lateral] : lateral [There are no fractures, subluxations or dislocations. No significant abnormalities are seen] : There are no fractures, subluxations or dislocations. No significant abnormalities are seen [FreeTextEntry8] : improving [FreeTextEntry9] : femur negative(OA medial knee) [Right] : right knee [] : medial tibial plateau tenderness

## 2022-07-21 NOTE — HISTORY OF PRESENT ILLNESS
[0] : 0 [Localized] : localized [Frequent] : frequent [de-identified] : Injured himself swinging golf club [place] driving range 12 days ago. First felt a twinge in thigh, then felt it pop. Developed some discoloration in medial thigh by knee [] : no [FreeTextEntry1] : right thigh  [FreeTextEntry3] : 6/12/22 [FreeTextEntry5] : patient felt a pop in the leg while playing golf. He has had pain since.

## 2022-08-15 ENCOUNTER — OFFICE (OUTPATIENT)
Dept: URBAN - METROPOLITAN AREA CLINIC 104 | Facility: CLINIC | Age: 77
Setting detail: OPHTHALMOLOGY
End: 2022-08-15
Payer: MEDICARE

## 2022-08-15 DIAGNOSIS — Z20.822: ICD-10-CM

## 2022-08-15 DIAGNOSIS — Z01.812: ICD-10-CM

## 2022-08-15 PROCEDURE — 99211 OFF/OP EST MAY X REQ PHY/QHP: CPT | Performed by: SPECIALIST

## 2022-08-15 ASSESSMENT — SPHEQUIV_DERIVED
OD_SPHEQUIV: 0.375
OS_SPHEQUIV: -0.25
OD_SPHEQUIV: -0.375
OS_SPHEQUIV: -0.25

## 2022-08-15 ASSESSMENT — REFRACTION_MANIFEST
OS_ADD: +2.50
OS_VA1: 20/30
OS_AXIS: 150
OD_CYLINDER: -1.25
OD_ADD: +2.50
OS_SPHERE: +0.25
OD_AXIS: 100
OD_SPHERE: +1.00
OS_CYLINDER: -1.00
OD_VA1: 20/25

## 2022-08-15 ASSESSMENT — KERATOMETRY
OS_AXISANGLE_DEGREES: 144
OD_AXISANGLE_DEGREES: 173
OD_K1POWER_DIOPTERS: 43.32
OS_K2POWER_DIOPTERS: 45.00
OS_K1POWER_DIOPTERS: 44.23
METHOD_AUTO_MANUAL: AUTO
OD_K2POWER_DIOPTERS: 44.88

## 2022-08-15 ASSESSMENT — VISUAL ACUITY
OS_BCVA: 20/25
OD_BCVA: 20/30-

## 2022-08-15 ASSESSMENT — AXIALLENGTH_DERIVED
OS_AL: 23.2838
OD_AL: 23.231
OS_AL: 23.2838
OD_AL: 23.5179

## 2022-08-15 ASSESSMENT — REFRACTION_AUTOREFRACTION
OS_SPHERE: +0.25
OD_AXIS: 102
OD_SPHERE: +0.25
OS_AXIS: 147
OD_CYLINDER: -1.25
OS_CYLINDER: -1.00

## 2022-08-18 ENCOUNTER — APPOINTMENT (OUTPATIENT)
Dept: ORTHOPEDIC SURGERY | Facility: CLINIC | Age: 77
End: 2022-08-18

## 2022-08-18 VITALS — BODY MASS INDEX: 40.23 KG/M2 | WEIGHT: 297 LBS | HEIGHT: 72 IN

## 2022-08-18 DIAGNOSIS — S76.219A STRAIN OF ADDUCTOR MUSCLE, FASCIA AND TENDON OF UNSPECIFIED THIGH, INITIAL ENCOUNTER: ICD-10-CM

## 2022-08-18 PROCEDURE — 99213 OFFICE O/P EST LOW 20 MIN: CPT

## 2022-08-18 NOTE — HISTORY OF PRESENT ILLNESS
[0] : 0 [Localized] : localized [Frequent] : frequent [de-identified] : Injured himself swinging golf club [place] driving range 12 days ago. First felt a twinge in thigh, then felt it pop. Developed some discoloration in medial thigh by knee [] : no [FreeTextEntry1] : right thigh  [FreeTextEntry3] : 6/12/22 [FreeTextEntry5] : patient felt a pop in the leg while playing golf. He has had pain since.

## 2022-08-18 NOTE — PHYSICAL EXAM
[5___] : adduction 5[unfilled]/5 [AP] : anteroposterior [Lateral] : lateral [There are no fractures, subluxations or dislocations. No significant abnormalities are seen] : There are no fractures, subluxations or dislocations. No significant abnormalities are seen [Right] : right knee [] : no pain with hip abduction [FreeTextEntry8] : improving [FreeTextEntry9] : femur negative(OA medial knee)

## 2022-08-18 NOTE — REASON FOR VISIT
[FreeTextEntry2] : 8/18/22- Finishing his PT for right adductor tear, doing well\par 7/21/22- f/u right adductor tear, doing PT, making good progress

## 2022-08-22 ENCOUNTER — OFFICE (OUTPATIENT)
Dept: URBAN - METROPOLITAN AREA CLINIC 104 | Facility: CLINIC | Age: 77
Setting detail: OPHTHALMOLOGY
End: 2022-08-22
Payer: MEDICARE

## 2022-08-22 DIAGNOSIS — Z20.822: ICD-10-CM

## 2022-08-22 DIAGNOSIS — Z01.812: ICD-10-CM

## 2022-08-22 PROCEDURE — 99211 OFF/OP EST MAY X REQ PHY/QHP: CPT | Performed by: SPECIALIST

## 2022-08-22 ASSESSMENT — SPHEQUIV_DERIVED
OD_SPHEQUIV: -0.375
OS_SPHEQUIV: -0.25
OS_SPHEQUIV: -0.25
OD_SPHEQUIV: 0.375

## 2022-08-22 ASSESSMENT — REFRACTION_MANIFEST
OS_ADD: +2.50
OD_CYLINDER: -1.25
OD_ADD: +2.50
OS_SPHERE: +0.25
OD_SPHERE: +1.00
OS_CYLINDER: -1.00
OS_VA1: 20/30
OS_AXIS: 150
OD_VA1: 20/25
OD_AXIS: 100

## 2022-08-22 ASSESSMENT — AXIALLENGTH_DERIVED
OD_AL: 23.231
OS_AL: 23.2838
OS_AL: 23.2838
OD_AL: 23.5179

## 2022-08-22 ASSESSMENT — REFRACTION_AUTOREFRACTION
OD_CYLINDER: -1.25
OD_AXIS: 102
OD_SPHERE: +0.25
OS_AXIS: 147
OS_SPHERE: +0.25
OS_CYLINDER: -1.00

## 2022-08-22 ASSESSMENT — KERATOMETRY
OD_K2POWER_DIOPTERS: 44.88
OS_AXISANGLE_DEGREES: 144
OS_K2POWER_DIOPTERS: 45.00
OS_K1POWER_DIOPTERS: 44.23
OD_K1POWER_DIOPTERS: 43.32
METHOD_AUTO_MANUAL: AUTO
OD_AXISANGLE_DEGREES: 173

## 2022-08-22 ASSESSMENT — VISUAL ACUITY
OS_BCVA: 20/25
OD_BCVA: 20/30-

## 2022-11-01 NOTE — DISCHARGE NOTE ADULT - LAUNCH MEDICATION RECONCILIATION
"Liquid Nitrogen Wound Care     - the areas treated with liquid nitrogen may form sores, blisters, and scabs. This is normal   - if a blister forms, please keep it intact and do not rupture it. It will resolve within a few days   - please keep petrolatum ointment to the area once to twice daily. You can cover with a bandage if you wish, but it is usually not necessary   - the area may be painful for a few days. We recommend ice, or over the counter tylenol or NSAIDs (such as ibuprofen or naproxen, if you are able to take these)   - this may result in a scar or a "hypopigmented" or lighter area of skin. This may or may not resolve with time   - please call our clinic if the lesion does not resolve, as this can be treated again    "
<<-----Click here for Discharge Medication Review

## 2022-11-08 ENCOUNTER — NON-APPOINTMENT (OUTPATIENT)
Age: 77
End: 2022-11-08

## 2022-11-08 LAB
ALBUMIN SERPL ELPH-MCNC: 4.2 G/DL
ANION GAP SERPL CALC-SCNC: 11 MMOL/L
BUN SERPL-MCNC: 27 MG/DL
CALCIUM SERPL-MCNC: 9.1 MG/DL
CHLORIDE SERPL-SCNC: 104 MMOL/L
CO2 SERPL-SCNC: 25 MMOL/L
CREAT SERPL-MCNC: 1.46 MG/DL
EGFR: 49 ML/MIN/1.73M2
GLUCOSE SERPL-MCNC: 96 MG/DL
PHOSPHATE SERPL-MCNC: 3.5 MG/DL
POTASSIUM SERPL-SCNC: 4.6 MMOL/L
SODIUM SERPL-SCNC: 140 MMOL/L

## 2022-11-10 ENCOUNTER — RX RENEWAL (OUTPATIENT)
Age: 77
End: 2022-11-10

## 2022-11-10 ENCOUNTER — APPOINTMENT (OUTPATIENT)
Dept: NEPHROLOGY | Facility: CLINIC | Age: 77
End: 2022-11-10

## 2022-11-10 VITALS
WEIGHT: 295 LBS | HEIGHT: 72 IN | BODY MASS INDEX: 39.96 KG/M2 | HEART RATE: 64 BPM | DIASTOLIC BLOOD PRESSURE: 73 MMHG | OXYGEN SATURATION: 97 % | TEMPERATURE: 97.3 F | SYSTOLIC BLOOD PRESSURE: 136 MMHG

## 2022-11-10 PROCEDURE — 99214 OFFICE O/P EST MOD 30 MIN: CPT

## 2022-11-10 RX ORDER — SODIUM ZIRCONIUM CYCLOSILICATE 10 G/10G
10 POWDER, FOR SUSPENSION ORAL
Qty: 1 | Refills: 2 | Status: DISCONTINUED | COMMUNITY
Start: 2022-03-10 | End: 2022-11-10

## 2022-11-10 NOTE — ASSESSMENT
[FreeTextEntry1] : The patient is a 77 years old man with a history of CKD III here for evaluation\par \par Chronic Kidney Disease Stage IIIb -- Based upon the history he has had long standing HTN and has followed with a nephrologist in [place]..  We have discussed chronic kidney disease, its stages, risk for progression and strategies for prevention including avoidance of NSAIDs and notifying me of medication changes.  Continue BP control and weight loss.   \par \par Hypertension -- The patient has hypertension in the setting of CKD.  He is well controlled and he will follow a strictly low salt diet.  He will continue his diet regimen.  The patient is on lisinopril and eplerenone therefore is at risk for hyperkalemia last potassium was WNL.  \par \par Hyperkalemia -- Patient had trouble affording lokelma and is off of it.  We started sodium bicarbonate and this seems to have helped dhis potassium   Low K diet.\par \par RTO 6 months or sooner if needed.  \par

## 2022-11-10 NOTE — REVIEW OF SYSTEMS
[Fever] : no fever [Chills] : no chills [Feeling Poorly] : not feeling poorly [Feeling Tired] : not feeling tired [Eyesight Problems] : no eyesight problems [Nosebleeds] : no nosebleeds [As noted in HPI] : as noted in HPI [Chest Pain] : no chest pain [Shortness Of Breath] : no shortness of breath [Orthopnea] : no orthopnea [PND] : no PND [Abdominal Pain] : no abdominal pain [Vomiting] : no vomiting [Dysuria] : no dysuria [Incontinence] : no incontinence [As Noted in HPI] : as noted in HPI [Itching] : no itching [Dizziness] : no dizziness [Fainting] : no fainting [FreeTextEntry5] : sometimes gets some LE swelling intermittently

## 2022-11-10 NOTE — PHYSICAL EXAM
[General Appearance - Alert] : alert [General Appearance - In No Acute Distress] : in no acute distress [General Appearance - Well Nourished] : well nourished [General Appearance - Well Developed] : well developed [Sclera] : the sclera and conjunctiva were normal [Hearing Threshold Finger Rub Not [place]] : hearing was normal [Neck Appearance] : the appearance of the neck was normal [Neck Cervical Mass (___cm)] : no neck mass was observed [Jugular Venous Distention Increased] : there was no jugular-venous distention [Respiration, Rhythm And Depth] : normal respiratory rhythm and effort [Exaggerated Use Of Accessory Muscles For Inspiration] : no accessory muscle use [Auscultation Breath Sounds / Voice Sounds] : lungs were clear to auscultation bilaterally [Heart Sounds] : normal S1 and S2 [Heart Sounds Gallop] : no gallops [Murmurs] : no murmurs [Heart Sounds Pericardial Friction Rub] : no pericardial rub [___ +] : bilateral [unfilled]+ pitting edema to the ankles [Abdomen Soft] : soft [Abdomen Tenderness] : non-tender [Abdomen Mass (___ Cm)] : no abdominal mass palpated [Cervical Lymph Nodes Enlarged Anterior Bilaterally] : anterior cervical [No CVA Tenderness] : no ~M costovertebral angle tenderness [No Spinal Tenderness] : no spinal tenderness [Abnormal Walk] : normal gait [] : no rash [Oriented To Time, Place, And Person] : oriented to person, place, and time [Affect] : the affect was normal [Mood] : the mood was normal

## 2022-11-10 NOTE — HISTORY OF PRESENT ILLNESS
[FreeTextEntry1] : Today I had the pleasure of meeting Carlitos Lanza.  He is a 73 years old man who is here today with his wife.  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  Most recently he has been living in [facility].  He is now moved back home for good to [place] to help take care of his 100 years old mother.  He has had hypertension for the last 30 years and he has been on some form of medication since that time.  More recently, in 2007, he had prostate cancer s/p seed implants, urethral stricture, and multiple TURP.  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  He is here today for evaluation of an elevated creatinine.  The patient states that he first became aware of kidney disease about four or so years ago and his baseline creatinine seem to be about a 1.4.  Recently, he was admitted to [facility] with urosepsis at which point his creatinine was 1.9 but then came down to his baseline.  On my evaluation today he feels well denies complaints no CP no SOB no NVD.  He has no hematuria or dysuria.  Of note he has also recently had an angiogram and he has afib and now has a loop recorder.\par \par 2/1/19 -- Today I saw Ruiz Lanza and he is feeling pretty well overall.  He has not had any major intercurrent health issues.  He has had blood work and a full physical recently at his PMD.  He has been struggling somewhat with weight / diet but he has been trying to get to the gym.  He has lost some weight.\par \par 10/19/20 -- since our last visit Ruiz has had a few issues including afib requiring xarelto.  is in the process of moving and recently exepriencign swelling in his legs but no dyspnea no orthopnea.  diet has been very poor because he is in the process of moving.  \par \par 5/10/21 -- Since our last visit ruiz is doing ok.  no urinary symptoms.  he has been back to the gym and is exercising more.  Has no chest pain no shortness of breath.\par \par 11/8/21 -- I saw Carlitos today along with his wife.  He is doing well and presently denies complaints.  He is exercising but continues to struggle with diet.  \par \par 5/9/22 -- Has been losing weight.  Got an implantable loop recorder site was infected.  Had one day recently where ankles swelled but improved.  Reviewed home BP log BP mostly in the 130 range\par \par 11/10/22 -- Has been doing ok.  recent gout attack.  reviewed his home BP log.  SBP mostly in 130's

## 2022-11-23 NOTE — DISCHARGE NOTE ADULT - MEDICATION SUMMARY - MEDICATIONS TO TAKE
Patient presents with lethargy related to hypoglycemia  Resolved with IV dextrose  Alert oriented x3 I will START or STAY ON the medications listed below when I get home from the hospital:    sodium chloride, 1% ophthalmic solution  -- 1 drop(s) in each eye once a day  -- Indication: For eye drops    Tylenol 500 mg oral tablet  -- 2 tab(s) by mouth 2 times a day  -- Indication: For Pain / fever    aspirin 81 mg oral tablet  -- 1 tab(s) by mouth once a day (at bedtime)  -- Indication: For Afib    flecainide 150 mg oral tablet  -- 1 tab(s) by mouth every 12 hours  -- Indication: For Afib    simvastatin 10 mg oral tablet  -- 1 tab(s) by mouth once a day (at bedtime)  -- Indication: For HLD (hyperlipidemia)    Bystolic 20 mg oral tablet  -- 1 tab(s) by mouth once a day  -- Indication: For HTN (hypertension)    amLODIPine 10 mg oral tablet  -- 1 tab(s) by mouth once a day  -- Indication: For HTN (hypertension)    raNITIdine 150 mg oral tablet  -- 1 tab(s) by mouth once a day (at bedtime)  -- Indication: For Need for prophylactic measure    Colace 100 mg oral capsule  -- 2 cap(s) by mouth once a day (at bedtime)  -- Indication: For stool softner    senna oral tablet  -- 2 tab(s) by mouth once a day (at bedtime)  -- Indication: For constipation    Fish Oil 1000 mg oral capsule  -- 2 cap(s) by mouth once a day  -- Indication: For Need for prophylactic measure    Pred Forte 1% ophthalmic suspension  -- 1 drop(s) in the left eye once a day  -- Indication: For eye drops    omeprazole 20 mg oral delayed release capsule  -- 1 cap(s) by mouth once a day  -- Indication: For GERD    ciprofloxacin 500 mg oral tablet  -- 1 tab(s) by mouth every 12 hours   -- Avoid prolonged or excessive exposure to direct and/or artificial sunlight while taking this medication.  Check with your doctor before becoming pregnant.  Do not take dairy products, antacids, or iron preparations within one hour of this medication.  Finish all this medication unless otherwise directed by prescriber.  Medication should be taken with plenty of water.    -- Indication: For Pyelonephritis    oxybutynin 5 mg oral tablet  -- 1 tab(s) by mouth 2 times a day  -- Indication: For BPH (benign prostatic hyperplasia)    VESIcare 10 mg oral tablet  -- 1 tab(s) by mouth once a day  -- Indication: For BPH (benign prostatic hyperplasia)    Centrum Silver oral tablet  -- 1 tab(s) by mouth once a day  -- Indication: For Need for prophylactic measure

## 2022-12-12 ENCOUNTER — NON-APPOINTMENT (OUTPATIENT)
Age: 77
End: 2022-12-12

## 2022-12-23 ENCOUNTER — LABORATORY RESULT (OUTPATIENT)
Age: 77
End: 2022-12-23

## 2022-12-23 ENCOUNTER — APPOINTMENT (OUTPATIENT)
Dept: UROLOGY | Facility: CLINIC | Age: 77
End: 2022-12-23

## 2022-12-23 VITALS
HEART RATE: 66 BPM | SYSTOLIC BLOOD PRESSURE: 128 MMHG | RESPIRATION RATE: 17 BRPM | DIASTOLIC BLOOD PRESSURE: 82 MMHG | WEIGHT: 295 LBS | BODY MASS INDEX: 39.96 KG/M2 | HEIGHT: 72 IN

## 2022-12-23 PROCEDURE — 99214 OFFICE O/P EST MOD 30 MIN: CPT

## 2022-12-23 PROCEDURE — 51798 US URINE CAPACITY MEASURE: CPT

## 2022-12-23 NOTE — PHYSICAL EXAM
[General Appearance - Well Developed] : well developed [General Appearance - Well Nourished] : well nourished [Normal Appearance] : normal appearance [Well Groomed] : well groomed [General Appearance - In No Acute Distress] : no acute distress [Urinary Bladder Findings] : the bladder was normal on palpation [Oriented To Time, Place, And Person] : oriented to person, place, and time [Affect] : the affect was normal [Mood] : the mood was normal [Not Anxious] : not anxious

## 2022-12-23 NOTE — ASSESSMENT
[FreeTextEntry1] : Patient is a 78 yo M who presents with recurrent/recalcitrant prostatic urethral stenosis.  Obstructive voiding symptoms now improved after TURP, urethral dilation, injection of kenalog.\par Radiation cystitis - no hematuria in years\par \par Feels emptying, overall LUTS stable\par Urine studies\par PSA\par PVR\par F/u 1 yr

## 2022-12-23 NOTE — HISTORY OF PRESENT ILLNESS
[FreeTextEntry1] : Patient is a 76 yo M who presents for f/u of urethral stricture/prostate stenosis and urinary urgency.\par \par From urologic standpoint he has been stable.  Frequency q2hrs.  Nocturia x3.  He is having slightly worse urgency/UUI, post void dribbling.  He is wearing 1 ppd. He has not had any gross hematuria in years.  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  No dysuria.  No fever/chills.  \par \par He has been following with Dr Pratt/Nephrology for CKD, Cr was relatively stable 1.46 in 11/2021.\par \par He has been dealing with family loss of nephrew and brother.\par \par \par PRIOR HX:\par He has h/o prostate cancer s/p brachy in 2007.  He developed urinary symptoms after radiation.  He had a TURP by Dr Scott, then states that he had a cystoscopy showing urethral stricture and developed AUR thereafter.  He then had catheter for several wks, ultimately had another TURP in 2014.  Most recent TURP (#3) in 10/2015 by Dr Kate.  He was recommended to have hyperbaric oxygen after 2nd TURP, but did not and ultimately underwent third TURP.   These events occurred in [place].  He has had two episodes of AUR, last in 2012.  He reports his LUTS are mainly weak stream, urinary frequency, nocturia x2.  He has been on flomax in past. Cysto and RUG here showed a proximal stricture from BM urethra to mid prostate.  Most recent PSA 1/2018 0.01.\par \par S/p mini-TURP, urethral dilation, injection of kenalog. He is dry at night sleeping, but has nocturia x2-3.  In daytime his control has stabilized, using 1 pad daily.   Good FOS and standing with urination.  Previously spraying with urination and weak stream and had to sit to void.\par \par Tried oxybutynin and vesicare without success for for urgency, minimal UUI.  He does not want botox procedure.  Only has UUI if he waits a long time to void.

## 2022-12-28 ENCOUNTER — APPOINTMENT (OUTPATIENT)
Age: 77
End: 2022-12-28

## 2022-12-28 ENCOUNTER — NON-APPOINTMENT (OUTPATIENT)
Age: 77
End: 2022-12-28

## 2022-12-28 LAB
APPEARANCE: CLEAR
BILIRUBIN URINE: NEGATIVE
BLOOD URINE: NEGATIVE
COLOR: YELLOW
GLUCOSE QUALITATIVE U: NEGATIVE
KETONES URINE: NEGATIVE
LEUKOCYTE ESTERASE URINE: NEGATIVE
NITRITE URINE: NEGATIVE
PH URINE: 6
PROTEIN URINE: ABNORMAL
PSA SERPL-MCNC: <0.01 NG/ML
SPECIFIC GRAVITY URINE: 1.03
URINE CYTOLOGY: NORMAL
UROBILINOGEN URINE: NORMAL

## 2023-02-15 ENCOUNTER — RX RENEWAL (OUTPATIENT)
Age: 78
End: 2023-02-15

## 2023-05-08 ENCOUNTER — RX RENEWAL (OUTPATIENT)
Age: 78
End: 2023-05-08

## 2023-05-22 NOTE — H&P PST ADULT - PROBLEM/PLAN-1
Conjuntivae and eyelids appear normal, Sclerae : White without injection
Was Januvia not covered?  
DISPLAY PLAN FREE TEXT

## 2023-06-02 ENCOUNTER — APPOINTMENT (OUTPATIENT)
Dept: NEPHROLOGY | Facility: CLINIC | Age: 78
End: 2023-06-02
Payer: MEDICARE

## 2023-06-02 VITALS
HEIGHT: 72 IN | DIASTOLIC BLOOD PRESSURE: 72 MMHG | OXYGEN SATURATION: 97 % | WEIGHT: 297.62 LBS | BODY MASS INDEX: 40.31 KG/M2 | SYSTOLIC BLOOD PRESSURE: 139 MMHG | TEMPERATURE: 98.1 F | HEART RATE: 64 BPM

## 2023-06-02 PROCEDURE — 99214 OFFICE O/P EST MOD 30 MIN: CPT

## 2023-06-02 RX ORDER — AMLODIPINE BESYLATE 5 MG/1
5 TABLET ORAL
Qty: 90 | Refills: 3 | Status: DISCONTINUED | COMMUNITY
Start: 2022-01-31 | End: 2023-06-02

## 2023-06-02 RX ORDER — PREDNISONE 10 MG/1
10 TABLET ORAL
Qty: 60 | Refills: 0 | Status: DISCONTINUED | COMMUNITY
Start: 2022-08-17 | End: 2023-06-02

## 2023-06-02 RX ORDER — METOPROLOL TARTRATE 50 MG/1
50 TABLET, FILM COATED ORAL
Qty: 180 | Refills: 0 | Status: DISCONTINUED | COMMUNITY
Start: 2022-06-14 | End: 2023-06-02

## 2023-06-02 NOTE — HISTORY OF PRESENT ILLNESS
[FreeTextEntry1] : Today I had the pleasure of meeting Carlitos Lanza.  He is a 73 years old man who is here today with his wife.  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  Most recently he has been living in [facility].  He is now moved back home for good to [place] to help take care of his 100 years old mother.  He has had hypertension for the last 30 years and he has been on some form of medication since that time.  More recently, in 2007, he had prostate cancer s/p seed implants, urethral stricture, and multiple TURP.  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  He is here today for evaluation of an elevated creatinine.  The patient states that he first became aware of kidney disease about four or so years ago and his baseline creatinine seem to be about a 1.4.  Recently, he was admitted to [facility] with urosepsis at which point his creatinine was 1.9 but then came down to his baseline.  On my evaluation today he feels well denies complaints no CP no SOB no NVD.  He has no hematuria or dysuria.  Of note he has also recently had an angiogram and he has afib and now has a loop recorder.\par \par 2/1/19 -- Today I saw Ruiz Lanza and he is feeling pretty well overall.  He has not had any major intercurrent health issues.  He has had blood work and a full physical recently at his PMD.  He has been struggling somewhat with weight / diet but he has been trying to get to the gym.  He has lost some weight.\par \par 10/19/20 -- since our last visit Ruiz has had a few issues including afib requiring xarelto.  is in the process of moving and recently exepriencign swelling in his legs but no dyspnea no orthopnea.  diet has been very poor because he is in the process of moving.  \par \par 5/10/21 -- Since our last visit ruiz is doing ok.  no urinary symptoms.  he has been back to the gym and is exercising more.  Has no chest pain no shortness of breath.\par \par 11/8/21 -- I saw Carlitos today along with his wife.  He is doing well and presently denies complaints.  He is exercising but continues to struggle with diet.  \par \par 5/9/22 -- Has been losing weight.  Got an implantable loop recorder site was infected.  Had one day recently where ankles swelled but improved.  Reviewed home BP log BP mostly in the 130 range\par \par 11/10/22 -- Has been doing ok.  recent gout attack.  reviewed his home BP log.  SBP mostly in 130's \par \par 6/2/23  -- Since our last visit is doing well home bp reviewed.  gets 's mostly.  sometimes has to take extra hydralazine.

## 2023-06-02 NOTE — PHYSICAL EXAM
[General Appearance - Alert] : alert [General Appearance - In No Acute Distress] : in no acute distress [General Appearance - Well Nourished] : well nourished [General Appearance - Well Developed] : well developed [Sclera] : the sclera and conjunctiva were normal [Hearing Threshold Finger Rub Not [place]] : hearing was normal [Neck Appearance] : the appearance of the neck was normal [Neck Cervical Mass (___cm)] : no neck mass was observed [Jugular Venous Distention Increased] : there was no jugular-venous distention [Respiration, Rhythm And Depth] : normal respiratory rhythm and effort [Exaggerated Use Of Accessory Muscles For Inspiration] : no accessory muscle use [Auscultation Breath Sounds / Voice Sounds] : lungs were clear to auscultation bilaterally [Heart Sounds] : normal S1 and S2 [Heart Sounds Gallop] : no gallops [Murmurs] : no murmurs [Heart Sounds Pericardial Friction Rub] : no pericardial rub [___ +] : bilateral [unfilled]+ pitting edema to the ankles [No CVA Tenderness] : no ~M costovertebral angle tenderness [No Spinal Tenderness] : no spinal tenderness [Abnormal Walk] : normal gait [] : no rash [Oriented To Time, Place, And Person] : oriented to person, place, and time [Affect] : the affect was normal [Mood] : the mood was normal

## 2023-06-02 NOTE — ASSESSMENT
[FreeTextEntry1] : The patient is a 78 years old man with a history of CKD III here for evaluation\par \par Chronic Kidney Disease Stage IIIb -- Based upon the history he has had long standing HTN and gout has followed with a nephrologist in [place]..  We have discussed chronic kidney disease, its stages, risk for progression and strategies for prevention including avoidance of NSAIDs and notifying me of medication changes.  Continue BP control and weight loss.   \par \par Hypertension -- The patient has hypertension in the setting of CKD.  He is well controlled and he will follow a strictly low salt diet.  He will continue his diet regimen.  The patient is on lisinopril and eplerenone therefore is at risk for hyperkalemia last potassium was WNL.  must repeat today he is overdue\par \par Hyperkalemia -- Patient had trouble affording lokelma and is off of it.  We started sodium bicarbonate and this seems to have helped dhis potassium   Low K diet.  follow up repeat today.\par \par RTO 6 months or sooner if needed.  \par \par The time spent is inclusive of the time it took to see, evaluate, and manage the patient.  Time is inclusive of the time taken to review chart, reconcile medications, document findings, and communicate with other providers (when applicable).\par

## 2023-06-05 LAB
ALBUMIN SERPL ELPH-MCNC: 4.6 G/DL
ANION GAP SERPL CALC-SCNC: 13 MMOL/L
BUN SERPL-MCNC: 23 MG/DL
CALCIUM SERPL-MCNC: 10 MG/DL
CHLORIDE SERPL-SCNC: 104 MMOL/L
CO2 SERPL-SCNC: 24 MMOL/L
CREAT SERPL-MCNC: 1.55 MG/DL
CREAT SPEC-SCNC: 83 MG/DL
EGFR: 45 ML/MIN/1.73M2
GLUCOSE SERPL-MCNC: 101 MG/DL
MICROALBUMIN 24H UR DL<=1MG/L-MCNC: 1.6 MG/DL
MICROALBUMIN/CREAT 24H UR-RTO: 19 MG/G
PHOSPHATE SERPL-MCNC: 4.5 MG/DL
POTASSIUM SERPL-SCNC: 5 MMOL/L
SODIUM SERPL-SCNC: 141 MMOL/L

## 2023-06-12 ENCOUNTER — APPOINTMENT (OUTPATIENT)
Dept: NEPHROLOGY | Facility: CLINIC | Age: 78
End: 2023-06-12

## 2023-06-19 ENCOUNTER — RX RENEWAL (OUTPATIENT)
Age: 78
End: 2023-06-19

## 2023-07-03 ENCOUNTER — NON-APPOINTMENT (OUTPATIENT)
Age: 78
End: 2023-07-03

## 2023-07-03 LAB
ALBUMIN SERPL ELPH-MCNC: 4.5 G/DL
ANION GAP SERPL CALC-SCNC: 14 MMOL/L
APPEARANCE: CLEAR
BILIRUBIN URINE: NEGATIVE
BLOOD URINE: NEGATIVE
BUN SERPL-MCNC: 22 MG/DL
CALCIUM SERPL-MCNC: 10.1 MG/DL
CHLORIDE SERPL-SCNC: 104 MMOL/L
CO2 SERPL-SCNC: 22 MMOL/L
COLOR: YELLOW
CREAT SERPL-MCNC: 1.57 MG/DL
EGFR: 44 ML/MIN/1.73M2
GLUCOSE QUALITATIVE U: NEGATIVE MG/DL
GLUCOSE SERPL-MCNC: 125 MG/DL
KETONES URINE: NEGATIVE MG/DL
LEUKOCYTE ESTERASE URINE: NEGATIVE
NITRITE URINE: NEGATIVE
PH URINE: 6
PHOSPHATE SERPL-MCNC: 3.9 MG/DL
POTASSIUM SERPL-SCNC: 5.3 MMOL/L
PROTEIN URINE: NORMAL MG/DL
SODIUM SERPL-SCNC: 140 MMOL/L
SPECIFIC GRAVITY URINE: 1.02
UROBILINOGEN URINE: 0.2 MG/DL

## 2023-07-18 NOTE — PHYSICAL THERAPY INITIAL EVALUATION ADULT - NAME OF CLINICIAN
Interventional Radiology  [facility] Consult Service Note    Abiel Ruiz  [id number]    07/18/23   12:41 PM    Consult Requested:  Need for percutaneous nephrostomy placement     Patients clinical information/history? Invasive urothelial carcinoma c/b by urinary obstruction and unilateral hydronephrosis & mild RUTHY. Outpatient oncologist recommended admission for PNT placement     Maxim Silverio MD on 07/18/23 1210  Call Back # [phone]     ===  Recommendations/Plan:  Add to IR schedule WED 7/19 for LEFT nephrostomy tube placement    Labs WNL for procedure   INR - 1.13  Plts - 306  AC - none    Orders entered for procedure, NPO status, and pre procedure IV antibiotics.     Medications to be held include: None    Consent will be done prior to procedure.     Please contact the IR charge RN at [phone] for estimated time of procedure.     Case and imaging discussed with IR Dr. Basilio Valle.    ===  This is a 67 year old female with past medical history of     Hx - Abiel Ruiz is a 67 year old female admitted on 7/18/2023. She has a history of urothelial carcinoma with muscular invasion complicated by urinary obstruction and hydronephrosis. She is admitted for PNT placement due to limited outpatient support.     ===  Pertinent Imaging Reviewed:    PET and CT on  6/21/2023     INDICATION: Staging for urothelial carcinoma.  Concern for metastatic  disease.; Malignant neoplasm of overlapping sites of bladder (H)     ADDITIONAL INFORMATION OBTAINED FROM EMR: 67-year-old female with  history of invasive urothelial carcinoma of the bladder not yet on  treatment.    IMPRESSION:   Summary:   - Hypermetabolic 4 cm bladder tumor, no hypermetabolic metastases, a  few indeterminant subcentimeter pelvic lymph nodes.   - Left-sided hydronephrosis, kidney is excreting, consider  stenting/PNT.     1. FDG avid residual known bladder malignancy involving the  trigone/neck  "extending inferiorly about the expected proximal urethra.  Accurate measurements are limited by noncontrast technique,  surrounding excreted contrast, and likely superimposed TURBT changes.  Consider contrast-enhanced pelvic MRI for better soft tissue  delineation.  2. Few prominent pelvic lymph nodes with approximate background FDG  uptake, most notably a 9 mm left pelvic wall lymph node, which are  indeterminant. No evidence for distant metastases.  3. 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        ===  Vitals:   BP (!) 144/70 (BP Location: Left arm)   Pulse 66   Temp 97.9  F (36.6  C) (Oral)   Resp 16   Ht 1.67 m (5' 5.75\")   Wt 77.2 kg (170 lb 1.6 oz)   SpO2 98%   BMI 27.67 kg/m      Pertinent Labs:   Lab Results   Component Value Date    WBC 7.7 07/18/2023    WBC 8.5 07/10/2023    WBC 7.6 06/26/2023     Lab Results   Component Value Date    HGB 11.9 07/18/2023    HGB 12.4 07/10/2023    HGB 12.6 06/26/2023     Lab Results   Component Value Date     07/18/2023     07/10/2023     06/26/2023     Lab Results   Component Value Date    INR 1.13 07/18/2023     Lab Results   Component Value Date    POTASSIUM 3.9 07/18/2023        COVID-19 Antibody Results, Testing for Immunity         No data to display            COVID-19 PCR Results         No data to display                  Rafa Espinosa PA-C  Interventional Radiology  Pager: [phone]      " HEATHER Louei

## 2023-10-27 ENCOUNTER — APPOINTMENT (OUTPATIENT)
Dept: ORTHOPEDIC SURGERY | Facility: CLINIC | Age: 78
End: 2023-10-27
Payer: MEDICARE

## 2023-10-27 VITALS — HEIGHT: 72 IN | BODY MASS INDEX: 40.23 KG/M2 | WEIGHT: 297 LBS

## 2023-10-27 DIAGNOSIS — M16.12 UNILATERAL PRIMARY OSTEOARTHRITIS, LEFT HIP: ICD-10-CM

## 2023-10-27 DIAGNOSIS — M76.892 OTHER SPECIFIED ENTHESOPATHIES OF LEFT LOWER LIMB, EXCLUDING FOOT: ICD-10-CM

## 2023-10-27 PROCEDURE — 73503 X-RAY EXAM HIP UNI 4/> VIEWS: CPT | Mod: LT

## 2023-10-27 PROCEDURE — 99203 OFFICE O/P NEW LOW 30 MIN: CPT

## 2023-10-27 PROCEDURE — 99213 OFFICE O/P EST LOW 20 MIN: CPT

## 2023-11-07 ENCOUNTER — NON-APPOINTMENT (OUTPATIENT)
Age: 78
End: 2023-11-07

## 2023-11-07 LAB
ALBUMIN SERPL ELPH-MCNC: 4.5 G/DL
ANION GAP SERPL CALC-SCNC: 15 MMOL/L
BUN SERPL-MCNC: 32 MG/DL
CALCIUM SERPL-MCNC: 9.6 MG/DL
CHLORIDE SERPL-SCNC: 105 MMOL/L
CO2 SERPL-SCNC: 21 MMOL/L
CREAT SERPL-MCNC: 1.75 MG/DL
EGFR: 39 ML/MIN/1.73M2
GLUCOSE SERPL-MCNC: 91 MG/DL
HCT VFR BLD CALC: 37.5 %
HGB BLD-MCNC: 12 G/DL
MCHC RBC-ENTMCNC: 28.4 PG
MCHC RBC-ENTMCNC: 32 GM/DL
MCV RBC AUTO: 88.7 FL
PHOSPHATE SERPL-MCNC: 3.8 MG/DL
PLATELET # BLD AUTO: 219 K/UL
POTASSIUM SERPL-SCNC: 4.8 MMOL/L
RBC # BLD: 4.23 M/UL
RBC # FLD: 15.3 %
SODIUM SERPL-SCNC: 141 MMOL/L
WBC # FLD AUTO: 7.4 K/UL

## 2023-12-14 ENCOUNTER — APPOINTMENT (OUTPATIENT)
Dept: NEPHROLOGY | Facility: CLINIC | Age: 78
End: 2023-12-14
Payer: MEDICARE

## 2023-12-14 VITALS
HEIGHT: 72 IN | SYSTOLIC BLOOD PRESSURE: 117 MMHG | BODY MASS INDEX: 39.55 KG/M2 | OXYGEN SATURATION: 97 % | HEART RATE: 67 BPM | TEMPERATURE: 97.7 F | DIASTOLIC BLOOD PRESSURE: 74 MMHG | WEIGHT: 292 LBS

## 2023-12-14 DIAGNOSIS — I10 ESSENTIAL (PRIMARY) HYPERTENSION: ICD-10-CM

## 2023-12-14 DIAGNOSIS — E87.5 HYPERKALEMIA: ICD-10-CM

## 2023-12-14 PROCEDURE — 99214 OFFICE O/P EST MOD 30 MIN: CPT

## 2023-12-14 NOTE — ASSESSMENT
[FreeTextEntry1] : The patient is a 78 years old man with a history of CKD III here for evaluation  Chronic Kidney Disease Stage IIIb -- Based upon the history he has had long standing HTN and gout has followed with a nephrologist in [place].. We have discussed chronic kidney disease, its stages, risk for progression and strategies for prevention including avoidance of NSAIDs and notifying me of medication changes. Continue BP control and weight loss.  Last creatine 1.7 was stable will repeat today.  Hypertension -- The patient has hypertension in the setting of CKD. He is well controlled and he will follow a strictly low salt diet. He will continue his diet regimen. The patient is on lisinopril and eplerenone therefore is at risk for hyperkalemia last potassium was WNL. Added chlorthalidone 12.5 mg po daily.  If blood work stable today will increase to 25 mg daily  Hyperkalemia -- Patient had trouble affording lokelma and is off of it. We started sodium bicarbonate and this seems to have helped his potassium Low K diet. follow up repeat today.  RTO 6 months or sooner if needed.

## 2023-12-14 NOTE — HISTORY OF PRESENT ILLNESS
[FreeTextEntry1] : Today I had the pleasure of meeting Carlitos Lanza.  He is a 73 years old man who is here today with his wife.  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  Most recently he has been living in [facility].  He is now moved back home for good to [place] to help take care of his 100 years old mother.  He has had hypertension for the last 30 years and he has been on some form of medication since that time.  More recently, in 2007, he had prostate cancer s/p seed implants, urethral stricture, and multiple TURP.  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  He is here today for evaluation of an elevated creatinine.  The patient states that he first became aware of kidney disease about four or so years ago and his baseline creatinine seem to be about a 1.4.  Recently, he was admitted to [facility] with urosepsis at which point his creatinine was 1.9 but then came down to his baseline.  On my evaluation today he feels well denies complaints no CP no SOB no NVD.  He has no hematuria or dysuria.  Of note he has also recently had an angiogram and he has afib and now has a loop recorder.  2/1/19 -- Today I saw Ruiz Lanza and he is feeling pretty well overall.  He has not had any major intercurrent health issues.  He has had blood work and a full physical recently at his PMD.  He has been struggling somewhat with weight / diet but he has been trying to get to the gym.  He has lost some weight.  10/19/20 -- since our last visit Ruiz has had a few issues including afib requiring xarelto.  is in the process of moving and recently exepriencign swelling in his legs but no dyspnea no orthopnea.  diet has been very poor because he is in the process of moving.    5/10/21 -- Since our last visit ruiz is doing ok.  no urinary symptoms.  he has been back to the gym and is exercising more.  Has no chest pain no shortness of breath.  11/8/21 -- I saw Carlitos today along with his wife.  He is doing well and presently denies complaints.  He is exercising but continues to struggle with diet.    5/9/22 -- Has been losing weight.  Got an implantable loop recorder site was infected.  Had one day recently where ankles swelled but improved.  Reviewed home BP log BP mostly in the 130 range  11/10/22 -- Has been doing ok.  recent gout attack.  reviewed his home BP log.  SBP mostly in 130's   6/2/23  -- Since our last visit is doing well home bp reviewed.  gets 's mostly.  sometimes has to take extra hydralazine.  12/14/23 -- Since our last visit he is doing ok.  SBP at home ranging 130 to 150 sometimes 150 but never low.  has some cramping.

## 2023-12-14 NOTE — PHYSICAL EXAM
[General Appearance - Alert] : alert [General Appearance - In No Acute Distress] : in no acute distress [General Appearance - Well Nourished] : well nourished [General Appearance - Well Developed] : well developed [Sclera] : the sclera and conjunctiva were normal [Hearing Threshold Finger Rub Not [place]] : hearing was normal [Neck Appearance] : the appearance of the neck was normal [Neck Cervical Mass (___cm)] : no neck mass was observed [Jugular Venous Distention Increased] : there was no jugular-venous distention [Respiration, Rhythm And Depth] : normal respiratory rhythm and effort [Exaggerated Use Of Accessory Muscles For Inspiration] : no accessory muscle use [Auscultation Breath Sounds / Voice Sounds] : lungs were clear to auscultation bilaterally [Heart Sounds] : normal S1 and S2 [Heart Sounds Gallop] : no gallops [Murmurs] : no murmurs [Heart Sounds Pericardial Friction Rub] : no pericardial rub [___ +] : bilateral [unfilled]+ pitting edema to the ankles [No CVA Tenderness] : no ~M costovertebral angle tenderness [No Spinal Tenderness] : no spinal tenderness [Abnormal Walk] : normal gait [] : no rash [Oriented To Time, Place, And Person] : oriented to person, place, and time [Affect] : the affect was normal [Mood] : the mood was normal

## 2023-12-18 LAB
ALBUMIN SERPL ELPH-MCNC: 4.7 G/DL
ANION GAP SERPL CALC-SCNC: 13 MMOL/L
BUN SERPL-MCNC: 40 MG/DL
CALCIUM SERPL-MCNC: 9.4 MG/DL
CHLORIDE SERPL-SCNC: 104 MMOL/L
CO2 SERPL-SCNC: 19 MMOL/L
CREAT SERPL-MCNC: 2.06 MG/DL
EGFR: 32 ML/MIN/1.73M2
GLUCOSE SERPL-MCNC: 98 MG/DL
MAGNESIUM SERPL-MCNC: 2.2 MG/DL
PHOSPHATE SERPL-MCNC: 4 MG/DL
POTASSIUM SERPL-SCNC: 5.4 MMOL/L
SODIUM SERPL-SCNC: 136 MMOL/L

## 2023-12-29 ENCOUNTER — NON-APPOINTMENT (OUTPATIENT)
Age: 78
End: 2023-12-29

## 2024-01-02 RX ORDER — CHLORTHALIDONE 25 MG/1
25 TABLET ORAL
Qty: 90 | Refills: 1 | Status: ACTIVE | COMMUNITY
Start: 2023-10-17 | End: 1900-01-01

## 2024-01-25 ENCOUNTER — NON-APPOINTMENT (OUTPATIENT)
Age: 79
End: 2024-01-25

## 2024-01-25 DIAGNOSIS — N18.32 CHRONIC KIDNEY DISEASE, STAGE 3B: ICD-10-CM

## 2024-01-25 LAB
ALBUMIN SERPL ELPH-MCNC: 4.5 G/DL
ANION GAP SERPL CALC-SCNC: 11 MMOL/L
BUN SERPL-MCNC: 38 MG/DL
CALCIUM SERPL-MCNC: 9.4 MG/DL
CHLORIDE SERPL-SCNC: 100 MMOL/L
CO2 SERPL-SCNC: 22 MMOL/L
CREAT SERPL-MCNC: 1.97 MG/DL
EGFR: 34 ML/MIN/1.73M2
GLUCOSE SERPL-MCNC: 105 MG/DL
PHOSPHATE SERPL-MCNC: 4.1 MG/DL
POTASSIUM SERPL-SCNC: 5.2 MMOL/L
SODIUM SERPL-SCNC: 134 MMOL/L

## 2024-01-30 ENCOUNTER — APPOINTMENT (OUTPATIENT)
Dept: UROLOGY | Facility: CLINIC | Age: 79
End: 2024-01-30
Payer: MEDICARE

## 2024-01-30 VITALS — DIASTOLIC BLOOD PRESSURE: 80 MMHG | SYSTOLIC BLOOD PRESSURE: 143 MMHG | HEART RATE: 75 BPM

## 2024-01-30 DIAGNOSIS — C61 MALIGNANT NEOPLASM OF PROSTATE: ICD-10-CM

## 2024-01-30 DIAGNOSIS — R39.15 URGENCY OF URINATION: ICD-10-CM

## 2024-01-30 DIAGNOSIS — N42.89 OTHER SPECIFIED DISORDERS OF PROSTATE: ICD-10-CM

## 2024-01-30 PROCEDURE — 51798 US URINE CAPACITY MEASURE: CPT

## 2024-01-30 PROCEDURE — 99213 OFFICE O/P EST LOW 20 MIN: CPT

## 2024-01-30 NOTE — HISTORY OF PRESENT ILLNESS
[FreeTextEntry1] : Patient is a 77 yo M who presents for f/u of urethral stricture/prostate stenosis and urinary urgency.  From urologic standpoint he has been stable. Frequency q2hrs. Nocturia x3. He is having slightly worse urgency/UUI, post void dribbling. He is wearing 1 ppd. He has not had any gross hematuria in years. 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 No dysuria. No fever/chills.  He has been following with Dr Pratt/Nephrology for CKD, Cr was relatively stable 1.46 in 11/2021.  He has been dealing with family loss of nephew and brother.  1/30/2024 He presents for follow up today. Report LUTS stable, nocturia x2. He has been wearing depends x 1 daily. Occasional post void dribbling. Notice urinary incontinence while washing dishes. He has been doing kegel exericse. Reports no gross hematuria in the past year. No UTI episode. Feels empty well.  He has been followed by Nephrologist; diuretics was adjusted from 26 mg to 12.5 mg daily. He also uses CPAP for MANE nightly.    PRIOR HX:  He has h/o prostate cancer s/p brachy in 2007. He developed urinary symptoms after radiation. He had a TURP by Dr Scott, then states that he had a cystoscopy showing urethral stricture and developed AUR thereafter. He then had catheter for several wks, ultimately had another TURP in 2014. Most recent TURP (#3) in 10/2015 by Dr Kate. He was recommended to have hyperbaric oxygen after 2nd TURP, but did not and ultimately underwent third TURP. These events occurred in [place]. He has had two episodes of AUR, last in 2012. He reports his LUTS are mainly weak stream, urinary frequency, nocturia x2. He has been on flomax in past. Cysto and RUG here showed a proximal stricture from BM urethra to mid prostate. Most recent PSA 1/2018 0.01.  S/p mini-TURP, urethral dilation, injection of kenalog. He is dry at night sleeping, but has nocturia x2-3. In daytime his control has stabilized, using 1 pad daily. Good FOS and standing with urination. Previously spraying with urination and weak stream and had to sit to void.  Tried oxybutynin and vesicare without success for for urgency, minimal UUI. He does not want botox procedure. Only has UUI if he waits a long time to void.  24

## 2024-01-30 NOTE — ASSESSMENT
[FreeTextEntry1] : Patient is a 77 yo M who presents with recurrent/recalcitrant prostatic urethral stenosis.  Obstructive voiding symptoms now improved after TURP, urethral dilation, injection of kenalog. 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

## 2024-01-30 NOTE — PHYSICAL EXAM
[General Appearance - Well Developed] : well developed [General Appearance - Well Nourished] : well nourished [Normal Appearance] : normal appearance [Well Groomed] : well groomed [General Appearance - In No Acute Distress] : no acute distress [] : no respiratory distress [Respiration, Rhythm And Depth] : normal respiratory rhythm and effort [Exaggerated Use Of Accessory Muscles For Inspiration] : no accessory muscle use [Abdomen Soft] : soft [Abdomen Tenderness] : non-tender [Urinary Bladder Findings] : the bladder was normal on palpation

## 2024-02-02 ENCOUNTER — APPOINTMENT (OUTPATIENT)
Age: 79
End: 2024-02-02

## 2024-02-02 LAB
APPEARANCE: CLEAR
BACTERIA: NEGATIVE /HPF
BILIRUBIN URINE: NEGATIVE
BLOOD URINE: NEGATIVE
CAST: 1 /LPF
COLOR: YELLOW
EPITHELIAL CELLS: 1 /HPF
GLUCOSE QUALITATIVE U: NEGATIVE MG/DL
KETONES URINE: NEGATIVE MG/DL
LEUKOCYTE ESTERASE URINE: NEGATIVE
MICROSCOPIC-UA: NORMAL
NITRITE URINE: NEGATIVE
PH URINE: 5.5
PROTEIN URINE: NEGATIVE MG/DL
PSA SERPL-MCNC: <0.01 NG/ML
RED BLOOD CELLS URINE: 1 /HPF
SPECIFIC GRAVITY URINE: 1.02
URINE CYTOLOGY: NORMAL
UROBILINOGEN URINE: 0.2 MG/DL
WHITE BLOOD CELLS URINE: 0 /HPF

## 2024-02-15 LAB
ALBUMIN SERPL ELPH-MCNC: 4.5 G/DL
ANION GAP SERPL CALC-SCNC: 14 MMOL/L
BUN SERPL-MCNC: 34 MG/DL
CALCIUM SERPL-MCNC: 9.7 MG/DL
CHLORIDE SERPL-SCNC: 105 MMOL/L
CO2 SERPL-SCNC: 23 MMOL/L
CREAT SERPL-MCNC: 1.9 MG/DL
EGFR: 35 ML/MIN/1.73M2
GLUCOSE SERPL-MCNC: 110 MG/DL
PHOSPHATE SERPL-MCNC: 3.5 MG/DL
POTASSIUM SERPL-SCNC: 5.1 MMOL/L
SODIUM SERPL-SCNC: 142 MMOL/L

## 2024-05-13 ENCOUNTER — RX RENEWAL (OUTPATIENT)
Age: 79
End: 2024-05-13

## 2024-05-13 RX ORDER — SODIUM BICARBONATE 650 MG/1
650 TABLET ORAL
Qty: 90 | Refills: 3 | Status: ACTIVE | COMMUNITY
Start: 2022-06-03 | End: 1900-01-01

## 2024-06-14 ENCOUNTER — RX RENEWAL (OUTPATIENT)
Age: 79
End: 2024-06-14

## 2024-07-10 NOTE — H&P PST ADULT - ANTERIOR CERVICAL L
Subjective:       Jamia Smith is a 24 y.o. female  status post CS (24) presents for her 6 week postpartum visit. Denies any complaints. Infant doing well, bottle feeding. Reports mild depression, denies SI/HI.     The patient's history was reviewed and updated.    Gyn History:    Menstrual History   Cycle: Yes (24)  Menarche Age: 11 years  Flow Duration: 5 (First period since delivery)  Flow: Normal  Interval:  (First period since delivery)  Intermenstrual Bleeding: No  Dysmenorrhea: No  [place]  Sexually Active: Yes  Sexual Orientation: heterosexual  Postcoital Bleeding: No  Dyspareunia: No  STI History: No  Contraception: No  Menopause  Menopause Age: 1 years  Breast History  Last Breast Imaging Date: No  History of Breast Biopsy: No  Pap History   Last pap date: 02/15/22  Result: Normal  History of Abnormal Pap: No  HPV Vaccine Completed: Yes (3/3)        OB History    Para Term  AB Living   2 2 2     2   SAB IAB Ectopic Multiple Live Births         0 2      # Outcome Date GA Lbr Meir/2nd Weight Sex Type Anes PTL Lv   2 Term 22 38w1d  3.057 kg (6 lb 11.8 oz) M CS-LTranv Spinal N THELMA   1 Term 21 39w1d  3.186 kg (7 lb 0.4 oz) M CS-LTranv EPI N THELMA      Complications: Fetal Intolerance, Failure to Progress in First Stage         Review of Systems  General/Constitutional: Chills denies. Fatigue/weakness denies. Fever denies . Night sweats denies . Hot flashes denies  Gastrointestinal: Abdominal pain denies. Blood in stool denies. Constipation denies. Diarrhea denies. Heartburn denies. Nausea denies. Vomiting denies   Genitourinary: Incontinence denies. Blood in urine denies. Frequent urination denies.  Urgency denies. Painful urination denies. Nocturia denies   Gynecologic: Irregular menses denies.  Heavy bleeding  denies.  Painful menses denies.  Vaginal discharge denies. Vaginal odor denies. Vaginal itching/Irritation denies. Vaginal lesion denies.  Pelvic pain  "denies. Decreased libido denies. Vulvar lesion denies. Prolapse of genital organs denies. Painful intercourse denies. Postcoital bleeding denies   Psychiatric: Mood lability denies.  Depressed mood denies. Suicidal thoughts denies. Anxiety denies.  Overwhelmed denies.  Appetite normal. Energy level normal       Physical Exam:   /70   Ht 5' 4" (1.626 m)   Wt 57.2 kg (126 lb)   LMP 07/09/2024 (Exact Date)   Breastfeeding No   BMI 21.63 kg/m²      Chaperone present.      Constitutional: General appearance: healthy, well-nourished and well-developed   Psychiatric: Orientation to time, place and person. Normal mood and affect and active, alert   Breast: Inspection/palpation: no tenderness, masses, skin changes or abnormal secretions   Abdomen:      Auscultation/Inspection/Palpation: No tenderness or masses. Soft, nondistended      Inspection of incision site: well healed, clean, dry, intact and non-tender       Hernia: no palpable hernia     Female Genitalia:      Vulva: deferred     Assessment:     1. Postpartum exam    2. Mild depression           Plan:     May return to normal activities  Healthy diet and exercise  declines contraception at this time    Discussed anxiety and depression in detail.   Discussed treatment options including life style modification- healthy diet and exercise, spending time outdoors. Discussed medications and all associated risks and side effects of SSRI's. Discussed counseling. Strict precautions. Pt desires to avoid medication. Able to cope at this time.     RTC prn/annual     This note was transcribed by Obdulia Del Angel. There may be transcription errors as a result, however minimal. Effort has been made to ensure accuracy of transcription, but any obvious errors or omissions should be clarified with the author of the document.         " normal

## 2024-07-15 ENCOUNTER — RX RENEWAL (OUTPATIENT)
Age: 79
End: 2024-07-15

## 2024-07-20 ENCOUNTER — NON-APPOINTMENT (OUTPATIENT)
Age: 79
End: 2024-07-20

## 2024-07-24 LAB
ALBUMIN SERPL ELPH-MCNC: 4.5 G/DL
ANION GAP SERPL CALC-SCNC: 16 MMOL/L
BUN SERPL-MCNC: 31 MG/DL
CALCIUM SERPL-MCNC: 9.4 MG/DL
CHLORIDE SERPL-SCNC: 104 MMOL/L
CO2 SERPL-SCNC: 22 MMOL/L
CREAT SERPL-MCNC: 2.08 MG/DL
EGFR: 31 ML/MIN/1.73M2
GLUCOSE SERPL-MCNC: 74 MG/DL
PHOSPHATE SERPL-MCNC: 2.8 MG/DL
POTASSIUM SERPL-SCNC: 5.3 MMOL/L
SODIUM SERPL-SCNC: 142 MMOL/L

## 2024-07-25 ENCOUNTER — APPOINTMENT (OUTPATIENT)
Dept: NEPHROLOGY | Facility: CLINIC | Age: 79
End: 2024-07-25
Payer: MEDICARE

## 2024-07-25 VITALS
OXYGEN SATURATION: 97 % | BODY MASS INDEX: 39.14 KG/M2 | TEMPERATURE: 97.8 F | WEIGHT: 289 LBS | DIASTOLIC BLOOD PRESSURE: 79 MMHG | SYSTOLIC BLOOD PRESSURE: 131 MMHG | HEIGHT: 72 IN | HEART RATE: 65 BPM

## 2024-07-25 DIAGNOSIS — E87.5 HYPERKALEMIA: ICD-10-CM

## 2024-07-25 DIAGNOSIS — I10 ESSENTIAL (PRIMARY) HYPERTENSION: ICD-10-CM

## 2024-07-25 PROCEDURE — 99215 OFFICE O/P EST HI 40 MIN: CPT

## 2024-07-25 RX ORDER — AMLODIPINE BESYLATE VALSARTAN HYDROCHLOROTHIAZIDE 10; 25; 160 MG/1; MG/1; MG/1
10-160-25 TABLET, FILM COATED ORAL DAILY
Qty: 30 | Refills: 0 | Status: ACTIVE | COMMUNITY
Start: 2024-07-25 | End: 1900-01-01

## 2024-07-25 NOTE — PHYSICAL EXAM
[General Appearance - Alert] : alert [General Appearance - In No Acute Distress] : in no acute distress [General Appearance - Well Nourished] : well nourished [General Appearance - Well Developed] : well developed [Sclera] : the sclera and conjunctiva were normal [Hearing Threshold Finger Rub Not [place]] : hearing was normal [Neck Appearance] : the appearance of the neck was normal [Neck Cervical Mass (___cm)] : no neck mass was observed [Jugular Venous Distention Increased] : there was no jugular-venous distention [Respiration, Rhythm And Depth] : normal respiratory rhythm and effort [Exaggerated Use Of Accessory Muscles For Inspiration] : no accessory muscle use [Auscultation Breath Sounds / Voice Sounds] : lungs were clear to auscultation bilaterally [Heart Sounds] : normal S1 and S2 [Heart Sounds Gallop] : no gallops [Murmurs] : no murmurs [Heart Sounds Pericardial Friction Rub] : no pericardial rub [___ +] : bilateral [unfilled]+ pitting edema to the ankles [No CVA Tenderness] : no ~M costovertebral angle tenderness [No Spinal Tenderness] : no spinal tenderness [Abnormal Walk] : normal gait [] : no rash [Oriented To Time, Place, And Person] : oriented to person, place, and time [Affect] : the affect was normal [Mood] : the mood was normal

## 2024-07-25 NOTE — HISTORY OF PRESENT ILLNESS
[FreeTextEntry1] : Today I had the pleasure of meeting Carlitos Lanza.  He is a 73 years old man who is here today with his wife.  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  Most recently he has been living in [facility].  He is now moved back home for good to [place] to help take care of his 100 years old mother.  He has had hypertension for the last 30 years and he has been on some form of medication since that time.  More recently, in 2007, he had prostate cancer s/p seed implants, urethral stricture, and multiple TURP.  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  He is here today for evaluation of an elevated creatinine.  The patient states that he first became aware of kidney disease about four or so years ago and his baseline creatinine seem to be about a 1.4.  Recently, he was admitted to [facility] with urosepsis at which point his creatinine was 1.9 but then came down to his baseline.  On my evaluation today he feels well denies complaints no CP no SOB no NVD.  He has no hematuria or dysuria.  Of note he has also recently had an angiogram and he has afib and now has a loop recorder.  2/1/19 -- Today I saw Ruiz Lanza and he is feeling pretty well overall.  He has not had any major intercurrent health issues.  He has had blood work and a full physical recently at his PMD.  He has been struggling somewhat with weight / diet but he has been trying to get to the gym.  He has lost some weight.  10/19/20 -- since our last visit Ruiz has had a few issues including afib requiring xarelto.  is in the process of moving and recently exepriencign swelling in his legs but no dyspnea no orthopnea.  diet has been very poor because he is in the process of moving.    5/10/21 -- Since our last visit ruiz is doing ok.  no urinary symptoms.  he has been back to the gym and is exercising more.  Has no chest pain no shortness of breath.  11/8/21 -- I saw Carlitos today along with his wife.  He is doing well and presently denies complaints.  He is exercising but continues to struggle with diet.    5/9/22 -- Has been losing weight.  Got an implantable loop recorder site was infected.  Had one day recently where ankles swelled but improved.  Reviewed home BP log BP mostly in the 130 range  11/10/22 -- Has been doing ok.  recent gout attack.  reviewed his home BP log.  SBP mostly in 130's   6/2/23  -- Since our last visit is doing well home bp reviewed.  gets 's mostly.  sometimes has to take extra hydralazine.  7/25/24 -- Saw Carlitos today.  ultimately feels well.  has a variety of stressors in his life.  he expresses high pill burden / fatigue.  his blood pressure is good at home 110's to 130's rarely 140s.  He only takes hydralazine once per day at this point.

## 2024-07-25 NOTE — ASSESSMENT
[FreeTextEntry1] : The patient is a 79 years old man with a history of CKD III here for evaluation  Chronic Kidney Disease Stage IIIb -- Based upon the history he has had long standing HTN and gout has followed with a nephrologist in [place].  We have discussed chronic kidney disease, its stages, risk for progression and strategies for prevention including avoidance of NSAIDs and notifying me of medication changes.  Continue BP control and weight loss.   Last creatinine done this week was 2.09 which represents stable disease.  Have not done urine specimen in about a year.  Will repeat.  Will follow up in six months.  Hypertension -- HTN in the setting of CKD.  Reviewed home BP logs.  BP is controlled.  only takes hydralazine daily at home.  Expressed high pill burden / pill fatigue.  Advised him to just stop the hydralazine it is probably not contributing much.  I sent in a combination pill for him of amlodipine 10 / valsartan 160 / HCTZ 25.  If this medication is covered will have him stop his current amlodipine, lisinopril, chlorthalidone.  Will then have him monitor home BP and repeat blood work two weeks later.  Hyperkalemia -- Patient had trouble affording lokelma and is off of it.  potassium improved.  on bicarb.  if changing to combo pill above will repeat K in two weeks.    RTO 6 months or sooner if needed.    The time spent is inclusive of the time it took to see, evaluate, and manage the patient.  Time is inclusive of the time taken to review chart, reconcile medications, document findings, and communicate with other providers (when applicable).

## 2024-07-29 DIAGNOSIS — N18.32 CHRONIC KIDNEY DISEASE, STAGE 3B: ICD-10-CM

## 2024-07-29 LAB
CREAT SPEC-SCNC: 108 MG/DL
MICROALBUMIN 24H UR DL<=1MG/L-MCNC: <1.2 MG/DL
MICROALBUMIN/CREAT 24H UR-RTO: NORMAL MG/G

## 2024-08-06 ENCOUNTER — APPOINTMENT (OUTPATIENT)
Dept: ORTHOPEDIC SURGERY | Facility: CLINIC | Age: 79
End: 2024-08-06

## 2024-08-06 PROBLEM — M19.011 GLENOHUMERAL ARTHRITIS, RIGHT: Status: ACTIVE | Noted: 2024-08-06

## 2024-08-06 PROBLEM — M19.012 GLENOHUMERAL ARTHRITIS, LEFT: Status: ACTIVE | Noted: 2024-08-06

## 2024-08-06 PROCEDURE — 73010 X-RAY EXAM OF SHOULDER BLADE: CPT | Mod: 50

## 2024-08-06 PROCEDURE — 73030 X-RAY EXAM OF SHOULDER: CPT | Mod: 50

## 2024-08-06 PROCEDURE — 99214 OFFICE O/P EST MOD 30 MIN: CPT

## 2024-08-06 NOTE — PHYSICAL EXAM
[Bilateral] : shoulder bilaterally [5 ___] : forward flexion 5[unfilled]/5 [5___] : external rotation 5[unfilled]/5 [] : tenderness at lateral shoulder [FreeTextEntry9] : FE R 120 L 110 ER R 20  L 20

## 2024-08-06 NOTE — HISTORY OF PRESENT ILLNESS
[2] : 2 [0] : 0 [Dull/Aching] : dull/aching [Localized] : localized [Intermittent] : intermittent [Retired] : Work status: retired [de-identified] : 8/6/24: 80 yo LHD male with bilateral shoulder pain since about May 2024. Denies specific injury. Left is worse than right. He reports most pain is lateral. He has pain reaching overhead. He hears crunch/cracking sound with lifting/reaching. There is pain at night. He tried topicals. H/o R shoulder scope around 2000. Occasionally takes Tylenol.   PMHx: CKD, HTN, A. Fib with loop recorder and xarleto, sleep apnea [] : Post Surgical Visit: no [FreeTextEntry1] : Paulo shoulder [FreeTextEntry3] : about 3 months ago [FreeTextEntry5] : No known injury, LHD, Left shoulder pain is worse than the right, states he had prior sx on his right shoulder about 15 years ago [de-identified] : certain movements  [de-identified] : about 15 years ago

## 2024-08-06 NOTE — ASSESSMENT
[FreeTextEntry1] : B/L CASSIUS BETTS. H/O R shoulder scope 15 yrs ago. Xrays and advanced imaging reviewed. Discussed op versus non op tx, including the r/b/a/c of both. Discussed rehab and recovery after RSA. Discussed timing, frequency and efficacy of cortisone injections. Discussed risks of repeated cortisone injections.  Discussed trial of visco supplementation. Avoid heavy, overhead lifting. Tylenol prn. No NSAIDs due to Xarelto.  RTO prn.

## 2024-08-09 ENCOUNTER — APPOINTMENT (OUTPATIENT)
Dept: ORTHOPEDIC SURGERY | Facility: CLINIC | Age: 79
End: 2024-08-09

## 2024-08-09 PROBLEM — M17.0 ARTHRITIS OF BOTH KNEES: Status: ACTIVE | Noted: 2024-08-09

## 2024-08-09 PROCEDURE — 20610 DRAIN/INJ JOINT/BURSA W/O US: CPT | Mod: 50

## 2024-08-09 PROCEDURE — 99214 OFFICE O/P EST MOD 30 MIN: CPT | Mod: 25

## 2024-08-09 PROCEDURE — 73562 X-RAY EXAM OF KNEE 3: CPT | Mod: 50

## 2024-08-09 NOTE — HISTORY OF PRESENT ILLNESS
[Burning] : burning [Dull/Aching] : dull/aching [Localized] : localized [Frequent] : frequent [de-identified] : Has long standing pain both knees, wanted to find out if he is eligible for viscoinjections. NO surgeries on the knees in the past.  [] : no [FreeTextEntry1] : b/l knee [FreeTextEntry5] : b/l knee pain developed a while ago [FreeTextEntry3] : 6/12/22

## 2024-08-09 NOTE — PHYSICAL EXAM
[Bilateral] : knee bilaterally [NL (0)] : extension 0 degrees [5___] : hamstring 5[unfilled]/5 [] : negative Lachmann [Positive] : positive Radha [TWNoteComboBox7] : flexion 120 degrees

## 2024-08-13 ENCOUNTER — NON-APPOINTMENT (OUTPATIENT)
Age: 79
End: 2024-08-13

## 2024-08-16 ENCOUNTER — APPOINTMENT (OUTPATIENT)
Dept: ORTHOPEDIC SURGERY | Facility: CLINIC | Age: 79
End: 2024-08-16
Payer: MEDICARE

## 2024-08-16 PROCEDURE — 99212 OFFICE O/P EST SF 10 MIN: CPT | Mod: 25

## 2024-08-16 PROCEDURE — 20610 DRAIN/INJ JOINT/BURSA W/O US: CPT | Mod: 50

## 2024-08-16 NOTE — HISTORY OF PRESENT ILLNESS
[Burning] : burning [Dull/Aching] : dull/aching [Localized] : localized [Frequent] : frequent [de-identified] : Has long standing pain both knees, wanted to find out if he is eligible for Visco injections. NO surgeries on the knees in the past.   8/16/24:  here for Orthovisc #2 bilateral knees.  No new issues.  Pain with ambulation.       [] : no [FreeTextEntry3] : 6/12/22 [FreeTextEntry1] : b/l knee [FreeTextEntry5] : b/l knee pain developed a while ago

## 2024-08-16 NOTE — IMAGING
[Bilateral] : knee bilaterally [advanced tricompartmental OA with medial compartment narrowing and varus alignment] : advanced tricompartmental OA with medial compartment narrowing and varus alignment normal

## 2024-08-16 NOTE — PHYSICAL EXAM
[Bilateral] : knee bilaterally [NL (0)] : extension 0 degrees [5___] : hamstring 5[unfilled]/5 [Positive] : positive Radha [] : negative Lachmann [TWNoteComboBox7] : flexion 120 degrees

## 2024-08-16 NOTE — HISTORY OF PRESENT ILLNESS
[Burning] : burning [Dull/Aching] : dull/aching [Localized] : localized [Frequent] : frequent [de-identified] : Has long standing pain both knees, wanted to find out if he is eligible for Visco injections. NO surgeries on the knees in the past.   8/16/24:  here for Orthovisc #2 bilateral knees.  No new issues.  Pain with ambulation.       [] : no [FreeTextEntry1] : b/l knee [FreeTextEntry3] : 6/12/22 [FreeTextEntry5] : b/l knee pain developed a while ago

## 2024-08-16 NOTE — ASSESSMENT
[FreeTextEntry1] : Case discussed. Orthovisc #2 today, post injection instructions given Ice Return one week to continue series AAOS knee conditioning handout provided May need replacements in future

## 2024-08-23 ENCOUNTER — APPOINTMENT (OUTPATIENT)
Dept: ORTHOPEDIC SURGERY | Facility: CLINIC | Age: 79
End: 2024-08-23
Payer: MEDICARE

## 2024-08-23 DIAGNOSIS — M17.0 BILATERAL PRIMARY OSTEOARTHRITIS OF KNEE: ICD-10-CM

## 2024-08-23 PROCEDURE — 99212 OFFICE O/P EST SF 10 MIN: CPT | Mod: 25

## 2024-08-23 PROCEDURE — 20610 DRAIN/INJ JOINT/BURSA W/O US: CPT | Mod: 50

## 2024-08-23 NOTE — HISTORY OF PRESENT ILLNESS
[de-identified] : Has long standing pain both knees, wanted to find out if he is eligible for Visco injections. NO surgeries on the knees in the past.   8/16/24:  here for Orthovisc #2 bilateral knees.  No new issues.  Pain with ambulation.       [Burning] : burning [Dull/Aching] : dull/aching [Localized] : localized [Frequent] : frequent [] : no [FreeTextEntry1] : b/l knee [FreeTextEntry3] : 6/12/22 [FreeTextEntry5] : b/l knee pain developed a while ago

## 2024-08-23 NOTE — PROCEDURE
[Large Joint Injection] : Large joint injection [Bilateral] : bilaterally of the [Knee] : knee [Pain] : pain [Alcohol] : alcohol [Betadine] : betadine [Ethyl Chloride sprayed topically] : ethyl chloride sprayed topically [Sterile technique used] : sterile technique used [Orthovisc (30mg)] : 30mg of Orthovisc [#3] : series #3

## 2024-09-05 ENCOUNTER — APPOINTMENT (OUTPATIENT)
Dept: ORTHOPEDIC SURGERY | Facility: CLINIC | Age: 79
End: 2024-09-05
Payer: MEDICARE

## 2024-09-05 DIAGNOSIS — M17.0 BILATERAL PRIMARY OSTEOARTHRITIS OF KNEE: ICD-10-CM

## 2024-09-05 PROCEDURE — 20610 DRAIN/INJ JOINT/BURSA W/O US: CPT | Mod: 50

## 2024-09-05 NOTE — HISTORY OF PRESENT ILLNESS
[Burning] : burning [Dull/Aching] : dull/aching [Localized] : localized [Frequent] : frequent [de-identified] :  09/05/2024: For bilateral knee Orthovisc No. 4  Has long standing pain both knees, wanted to find out if he is eligible for Visco injections. NO surgeries on the knees in the past.   8/16/24:  here for Orthovisc #2 bilateral knees.  No new issues.  Pain with ambulation.       [] : no [FreeTextEntry1] : b/l knee [FreeTextEntry5] : b/l knee pain developed a while ago [FreeTextEntry3] : 6/12/22

## 2024-09-05 NOTE — PROCEDURE
[Large Joint Injection] : Large joint injection [Bilateral] : bilaterally of the [Knee] : knee [Pain] : pain [Alcohol] : alcohol [Betadine] : betadine [Ethyl Chloride sprayed topically] : ethyl chloride sprayed topically [Sterile technique used] : sterile technique used [Orthovisc (30mg)] : 30mg of Orthovisc [#4] : series #4 [] : Patient tolerated procedure well [Call if redness, pain or fever occur] : call if redness, pain or fever occur [Apply ice for 15min out of every hour for the next 12-24 hours as tolerated] : apply ice for 15 minutes out of every hour for the next 12-24 hours as tolerated [Patient was advised to rest the joint(s) for ____ days] : patient was advised to rest the joint(s) for [unfilled] days [Previous OTC use and PT nontherapeutic] : patient has tried OTC's including aspirin, Ibuprofen, Aleve, etc or prescription NSAIDS, and/or exercises at home and/or physical therapy without satisfactory response [Patient had decreased mobility in the joint] : patient had decreased mobility in the joint [Risks, benefits, alternatives discussed / Verbal consent obtained] : the risks benefits, and alternatives have been discussed, and verbal consent was obtained

## 2024-11-22 ENCOUNTER — NON-APPOINTMENT (OUTPATIENT)
Age: 79
End: 2024-11-22

## 2025-01-08 ENCOUNTER — RX RENEWAL (OUTPATIENT)
Age: 80
End: 2025-01-08

## 2025-01-27 ENCOUNTER — RX RENEWAL (OUTPATIENT)
Age: 80
End: 2025-01-27

## 2025-01-31 ENCOUNTER — NON-APPOINTMENT (OUTPATIENT)
Age: 80
End: 2025-01-31

## 2025-01-31 ENCOUNTER — APPOINTMENT (OUTPATIENT)
Dept: UROLOGY | Facility: CLINIC | Age: 80
End: 2025-01-31
Payer: MEDICARE

## 2025-01-31 VITALS — HEART RATE: 73 BPM | DIASTOLIC BLOOD PRESSURE: 83 MMHG | SYSTOLIC BLOOD PRESSURE: 152 MMHG

## 2025-01-31 DIAGNOSIS — N13.8 BENIGN PROSTATIC HYPERPLASIA WITH LOWER URINARY TRACT SYMPMS: ICD-10-CM

## 2025-01-31 DIAGNOSIS — N40.1 BENIGN PROSTATIC HYPERPLASIA WITH LOWER URINARY TRACT SYMPMS: ICD-10-CM

## 2025-01-31 DIAGNOSIS — C61 MALIGNANT NEOPLASM OF PROSTATE: ICD-10-CM

## 2025-01-31 DIAGNOSIS — N35.919 UNSPECIFIED URETHRAL STRICTURE, MALE, UNSPECIFIED SITE: ICD-10-CM

## 2025-01-31 LAB
BILIRUB UR QL STRIP: NEGATIVE
CLARITY UR: CLEAR
COLLECTION METHOD: NORMAL
GLUCOSE UR-MCNC: NEGATIVE
HCG UR QL: 0.2 EU/DL
HGB UR QL STRIP.AUTO: 1.01
KETONES UR-MCNC: NEGATIVE
LEUKOCYTE ESTERASE UR QL STRIP: NEGATIVE
NITRITE UR QL STRIP: NEGATIVE
PH UR STRIP: NEGATIVE
PROT UR STRIP-MCNC: 5.5
SP GR UR STRIP: NEGATIVE

## 2025-01-31 PROCEDURE — G2211 COMPLEX E/M VISIT ADD ON: CPT

## 2025-01-31 PROCEDURE — 51798 US URINE CAPACITY MEASURE: CPT

## 2025-01-31 PROCEDURE — 99214 OFFICE O/P EST MOD 30 MIN: CPT

## 2025-01-31 RX ORDER — ALLOPURINOL 100 MG/1
100 TABLET ORAL
Refills: 0 | Status: ACTIVE | COMMUNITY

## 2025-02-08 LAB — PSA SERPL-MCNC: <0.01 NG/ML

## 2025-02-10 ENCOUNTER — APPOINTMENT (OUTPATIENT)
Age: 80
End: 2025-02-10

## 2025-02-10 LAB
ALBUMIN SERPL ELPH-MCNC: 4.3 G/DL
ANION GAP SERPL CALC-SCNC: 12 MMOL/L
BUN SERPL-MCNC: 26 MG/DL
CALCIUM SERPL-MCNC: 9.5 MG/DL
CHLORIDE SERPL-SCNC: 104 MMOL/L
CO2 SERPL-SCNC: 25 MMOL/L
CREAT SERPL-MCNC: 1.94 MG/DL
EGFR: 34 ML/MIN/1.73M2
GLUCOSE SERPL-MCNC: 102 MG/DL
PHOSPHATE SERPL-MCNC: 3.3 MG/DL
POTASSIUM SERPL-SCNC: 5 MMOL/L
SODIUM SERPL-SCNC: 141 MMOL/L
URATE SERPL-MCNC: 7.7 MG/DL

## 2025-02-13 ENCOUNTER — APPOINTMENT (OUTPATIENT)
Dept: NEPHROLOGY | Facility: CLINIC | Age: 80
End: 2025-02-13
Payer: MEDICARE

## 2025-02-13 VITALS — HEIGHT: 72 IN | WEIGHT: 297 LBS | BODY MASS INDEX: 40.23 KG/M2 | TEMPERATURE: 98 F

## 2025-02-13 VITALS — SYSTOLIC BLOOD PRESSURE: 156 MMHG | DIASTOLIC BLOOD PRESSURE: 91 MMHG

## 2025-02-13 DIAGNOSIS — N18.32 CHRONIC KIDNEY DISEASE, STAGE 3B: ICD-10-CM

## 2025-02-13 DIAGNOSIS — R06.01 ORTHOPNEA: ICD-10-CM

## 2025-02-13 PROCEDURE — 99214 OFFICE O/P EST MOD 30 MIN: CPT

## 2025-02-13 RX ORDER — TORSEMIDE 20 MG/1
20 TABLET ORAL DAILY
Qty: 7 | Refills: 2 | Status: ACTIVE | COMMUNITY
Start: 2025-02-13 | End: 1900-01-01

## 2025-05-08 ENCOUNTER — NON-APPOINTMENT (OUTPATIENT)
Age: 80
End: 2025-05-08

## 2025-05-09 ENCOUNTER — APPOINTMENT (OUTPATIENT)
Dept: ORTHOPEDIC SURGERY | Facility: CLINIC | Age: 80
End: 2025-05-09
Payer: MEDICARE

## 2025-05-09 DIAGNOSIS — M10.062 IDIOPATHIC GOUT, LEFT KNEE: ICD-10-CM

## 2025-05-09 DIAGNOSIS — M17.0 BILATERAL PRIMARY OSTEOARTHRITIS OF KNEE: ICD-10-CM

## 2025-05-09 PROCEDURE — 99214 OFFICE O/P EST MOD 30 MIN: CPT | Mod: 25

## 2025-05-09 PROCEDURE — 20610 DRAIN/INJ JOINT/BURSA W/O US: CPT | Mod: LT

## 2025-05-09 PROCEDURE — 73562 X-RAY EXAM OF KNEE 3: CPT | Mod: LT

## 2025-05-16 ENCOUNTER — APPOINTMENT (OUTPATIENT)
Dept: ORTHOPEDIC SURGERY | Facility: CLINIC | Age: 80
End: 2025-05-16

## 2025-06-18 ENCOUNTER — NON-APPOINTMENT (OUTPATIENT)
Age: 80
End: 2025-06-18

## 2025-06-20 ENCOUNTER — APPOINTMENT (OUTPATIENT)
Dept: NEPHROLOGY | Facility: CLINIC | Age: 80
End: 2025-06-20
Payer: MEDICARE

## 2025-06-20 VITALS
DIASTOLIC BLOOD PRESSURE: 73 MMHG | BODY MASS INDEX: 39.68 KG/M2 | TEMPERATURE: 98.4 F | WEIGHT: 293 LBS | HEART RATE: 71 BPM | SYSTOLIC BLOOD PRESSURE: 117 MMHG | OXYGEN SATURATION: 98 % | HEIGHT: 72 IN

## 2025-06-20 PROCEDURE — G2211 COMPLEX E/M VISIT ADD ON: CPT

## 2025-06-20 PROCEDURE — 99214 OFFICE O/P EST MOD 30 MIN: CPT

## 2025-06-20 RX ORDER — COLCHICINE 0.6 MG/1
0.6 TABLET ORAL
Refills: 0 | Status: ACTIVE | COMMUNITY

## 2025-06-30 ENCOUNTER — NON-APPOINTMENT (OUTPATIENT)
Age: 80
End: 2025-06-30